# Patient Record
Sex: MALE | Race: BLACK OR AFRICAN AMERICAN | NOT HISPANIC OR LATINO | URBAN - METROPOLITAN AREA
[De-identification: names, ages, dates, MRNs, and addresses within clinical notes are randomized per-mention and may not be internally consistent; named-entity substitution may affect disease eponyms.]

---

## 2018-07-09 ENCOUNTER — INPATIENT (INPATIENT)
Facility: HOSPITAL | Age: 48
LOS: 3 days | Discharge: HOME | End: 2018-07-13
Attending: HOSPITALIST | Admitting: HOSPITALIST

## 2018-07-09 VITALS
WEIGHT: 222.67 LBS | DIASTOLIC BLOOD PRESSURE: 63 MMHG | RESPIRATION RATE: 16 BRPM | HEART RATE: 65 BPM | SYSTOLIC BLOOD PRESSURE: 118 MMHG | HEIGHT: 72 IN | TEMPERATURE: 96 F

## 2018-07-09 DIAGNOSIS — G40.109 LOCALIZATION-RELATED (FOCAL) (PARTIAL) SYMPTOMATIC EPILEPSY AND EPILEPTIC SYNDROMES WITH SIMPLE PARTIAL SEIZURES, NOT INTRACTABLE, WITHOUT STATUS EPILEPTICUS: ICD-10-CM

## 2018-07-09 DIAGNOSIS — D33.2 BENIGN NEOPLASM OF BRAIN, UNSPECIFIED: Chronic | ICD-10-CM

## 2018-07-09 LAB
ALBUMIN SERPL ELPH-MCNC: 4 G/DL — SIGNIFICANT CHANGE UP (ref 3.5–5.2)
ALP SERPL-CCNC: 52 U/L — SIGNIFICANT CHANGE UP (ref 30–115)
ALT FLD-CCNC: 16 U/L — SIGNIFICANT CHANGE UP (ref 0–41)
ANION GAP SERPL CALC-SCNC: 9 MMOL/L — SIGNIFICANT CHANGE UP (ref 7–14)
AST SERPL-CCNC: 15 U/L — SIGNIFICANT CHANGE UP (ref 0–41)
BILIRUB SERPL-MCNC: 0.2 MG/DL — SIGNIFICANT CHANGE UP (ref 0.2–1.2)
BUN SERPL-MCNC: 15 MG/DL — SIGNIFICANT CHANGE UP (ref 10–20)
CALCIUM SERPL-MCNC: 9.2 MG/DL — SIGNIFICANT CHANGE UP (ref 8.5–10.1)
CHLORIDE SERPL-SCNC: 104 MMOL/L — SIGNIFICANT CHANGE UP (ref 98–110)
CO2 SERPL-SCNC: 28 MMOL/L — SIGNIFICANT CHANGE UP (ref 17–32)
CREAT SERPL-MCNC: 1 MG/DL — SIGNIFICANT CHANGE UP (ref 0.7–1.5)
GLUCOSE SERPL-MCNC: 84 MG/DL — SIGNIFICANT CHANGE UP (ref 70–99)
HCT VFR BLD CALC: 40.6 % — LOW (ref 42–52)
HGB BLD-MCNC: 12.9 G/DL — LOW (ref 14–18)
MAGNESIUM SERPL-MCNC: 2.2 MG/DL — SIGNIFICANT CHANGE UP (ref 1.8–2.4)
MCHC RBC-ENTMCNC: 27.5 PG — SIGNIFICANT CHANGE UP (ref 27–31)
MCHC RBC-ENTMCNC: 31.8 G/DL — LOW (ref 32–37)
MCV RBC AUTO: 86.6 FL — SIGNIFICANT CHANGE UP (ref 80–94)
NRBC # BLD: 0 /100 WBCS — SIGNIFICANT CHANGE UP (ref 0–0)
PLATELET # BLD AUTO: 186 K/UL — SIGNIFICANT CHANGE UP (ref 130–400)
POTASSIUM SERPL-MCNC: 4.5 MMOL/L — SIGNIFICANT CHANGE UP (ref 3.5–5)
POTASSIUM SERPL-SCNC: 4.5 MMOL/L — SIGNIFICANT CHANGE UP (ref 3.5–5)
PROT SERPL-MCNC: 6.6 G/DL — SIGNIFICANT CHANGE UP (ref 6–8)
RBC # BLD: 4.69 M/UL — LOW (ref 4.7–6.1)
RBC # FLD: 11.8 % — SIGNIFICANT CHANGE UP (ref 11.5–14.5)
SODIUM SERPL-SCNC: 141 MMOL/L — SIGNIFICANT CHANGE UP (ref 135–146)
WBC # BLD: 5.05 K/UL — SIGNIFICANT CHANGE UP (ref 4.8–10.8)
WBC # FLD AUTO: 5.05 K/UL — SIGNIFICANT CHANGE UP (ref 4.8–10.8)

## 2018-07-09 RX ORDER — SERTRALINE 25 MG/1
50 TABLET, FILM COATED ORAL DAILY
Qty: 0 | Refills: 0 | Status: DISCONTINUED | OUTPATIENT
Start: 2018-07-09 | End: 2018-07-13

## 2018-07-09 NOTE — CONSULT NOTE ADULT - SUBJECTIVE AND OBJECTIVE BOX
Neurology/Epilepsy Consult:    JERICAKESHAV OSUNA 47y Male  MRN-[id number]    Patient is a 47y old  Male who presents for elective VEEG       HPI: History obtained from patient and EMR        PAST MEDICAL & SURGICAL HISTORY:  Refractory epilepsy  Left mesial temporal sclerosis  Left anterior temporal lobectomy 2000  Mood disorder        FAMILY HISTORY:  No pertinent family history in first degree relatives        Social History: (-) x 3      Risk factors:  Born full-term, , no complications  Normal growth and development  No febrile seizures/CNS infections  No head trauma with loss of consciousness      Allergy:  No Known Allergies        Home Medications:        MEDICATIONS  (STANDING):  sertraline 50 milliGRAM(s) Oral daily    MEDICATIONS  (PRN):  LORazepam   Injectable 2 milliGRAM(s) IV Push three times a day PRN generalized tonic-clonic seizure lasting longer than 2 minutes        Review of systems:    Constitutional: No fever, weight loss or fatigue    Eyes: No eye pain or discharge  ENMT:  No difficulty hearing; No sinus or throat pain  Neck: No pain or stiffness  Respiratory: No cough, wheezing, chills or hemoptysis  Cardiovascular: No chest pain, palpitations, shortness of breath, dyspnea on exertion  Gastrointestinal: No abdominal pain, nausea, vomiting or hematemesis; No diarrhea or constipation.   Genitourinary: No dysuria, frequency, hematuria or incontinence  Neurological: As per HPI  Skin: No rashes or lesions   Endocrine: No heat or cold intolerance; No hair loss  Musculoskeletal: No joint pain or swelling  Psychiatric: No depression, anxiety, mood swings  Heme/Lymph: No easy bruising or bleeding gums      T(F): 96.4 (18 @ 11:30), Max: 96.4 (18 @ 11:30)  HR: 65 (18 @ 11:30) (65 - 65)  BP: 118/63 (18 @ 11:30) (118/63 - 118/63)  RR: 16 (18 @ 11:30) (16 - 16)  SpO2: --      Neurologic Examination:  General:  Appearance is consistent with chronologic age.  No abnormal facies.   General: The patient is oriented to person, place, time and date.  Recent and remote memory intact.  Follows 4-step directions. Fund of knowledge is intact and normal.  Language with normal repetition, comprehension and naming.  Nondysarthric.    Cranial nerves: EOMI w/o nystagmus, skew or reported double vision.  PERRL.  No ptosis/weakness of eyelid closure.  Facial sensation is normal with normal bite.  No facial asymmetry.  Hearing grossly intact b/l.  Palate elevates midline.  Tongue midline.  Motor examination:   Normal tone, bulk and range of motion.  No tenderness, twitching, tremors or involuntary movements.  Formal Muscle Strength Testin/5 UE; 5/5 LE.  No observable drift.  Reflexes:   2+ b/l pectoralis, biceps, triceps, brachioradialis, patella and Achilles.  Plantar response downgoing b/l.  Jaw jerk, Keith, clonus absent.  Sensory examination:   Intact to light touch and pinprick, pain, temperature and proprioception and vibration in all extremities.  Cerebellum:   FTN/HKS intact with normal ALEXIA in all limbs.  No dysmetria or dysdiadokinesia.  Gait narrow based and normal.      EEG:       Assessment:  This is a 47y Male with h/o refractory epilepsy, Left mesial temporal sclerosis, Left anterior temporal lobectomy , mood disorder, who self-discontinued Keppra several months ago. Patient reports one recent episode of possible nocturnal seizure.        Discussed with Dr. Saenz      Plan:   - VEEG for characterization and assessment  - Seizure precautions  - No AED for now  - Ativan 2mg IV PRN for generalized tonic-clonic seizure lasting longer than 2 minutes, or two consecutive seizures without return to baseline in-between (ordered)  - CBC, CMP, Mg (ordered)  - Keep Mg above 2    Plan discussed with patient in details, all questions answered Neurology/Epilepsy Consult:    KESHAV PIZANO 47y Male  MRN-[id number]    Patient is a 47y old  Male who presents for elective VEEG       HPI: History obtained from patient and EMR  Patient has h/o seizures since childhood, but was started on AED at around 21 yo. Patient was tried on various AED, diagnosed with Left mesial temporal sclerosis, and in  underwent Left anterior temporal lobectomy. Patient was followed by Dr. Saenz treated with Keppra 500mg AM-750mg HS for the last few years, but self-discontinued it in November-2017. Prior to that patient started follow up with alternative medicine doctor and was put on vitamins and supplements. Patient denies any difficulty tolerating Keppra, just thought he would do better without it.   Patient's typical seizures are nocturnal, and in the past patient was on occasion would wake up in the morning with body aches or incontinence. Patient denies any episodes like that for at least a year, but recently had a different type of event. About 2 weeks ago patient woke up at night feeling one arm being very still and unable to move it for several minutes(unsure R or L). During previous admissions for VEEG patient had nocturnal electrographical seizures that he was not aware of.        PAST MEDICAL & SURGICAL HISTORY:  Refractory epilepsy  Left mesial temporal sclerosis  Left anterior temporal lobectomy   Mood disorder        FAMILY HISTORY:  Uncle - epilepsy      Social History: (-) x 3      Allergy:  No Known Allergies      Home Medications:  Zoloft 50mg daily  Vitamin C  Vitamin D 2,000 IU daily  MVI  Vitamin E  Chromium      MEDICATIONS  (STANDING):  sertraline 50 milliGRAM(s) Oral daily    MEDICATIONS  (PRN):  LORazepam   Injectable 2 milliGRAM(s) IV Push three times a day PRN generalized tonic-clonic seizure lasting longer than 2 minutes        T(F): 96.4 (18 @ 11:30), Max: 96.4 (18 @ 11:30)  HR: 65 (18 @ 11:30) (65 - 65)  BP: 118/63 (18 @ 11:30) (118/63 - 118/63)  RR: 16 (18 @ 11:30) (16 - 16)  SpO2: --      Neurologic Examination:  General:  Appearance is consistent with chronologic age.  No abnormal facies.   General: The patient is oriented to person, place, time and date.  Recent and remote memory intact.  Follows 4-step directions. Language is slow with normal repetition, occasional difficulty with word finding.    Cranial nerves: EOMI w/o nystagmus, skew or reported double vision.  PERRL.  No ptosis/weakness of eyelid closure.  Facial sensation is normal with normal bite.  Minimal facial asymmetry.  Hearing grossly intact b/l.  Palate elevates midline.  Tongue midline.  Motor examination:   Normal tone, bulk and range of motion.  No tenderness, twitching, tremors or involuntary movements.  Formal Muscle Strength Testin/5 UE; 5/5 LE.  No observable drift.  Reflexes:   2+ b/l pectoralis, biceps, triceps, brachioradialis, patella and Achilles.    Sensory examination:   Intact to light touch and pinprick, pain  Cerebellum:   FTN/HKS intact with normal ALEXIA in all limbs.  No dysmetria or dysdiadokinesia.  Gait narrow based and normal.      REEG 2018 - left mid-frontal slowing, frequent left frontal and frontopolar spikes and after going slow waves.  VEEG x 72 hrs  - 2013 - left FT slowing  VEEG x 6 days  - 2012 - 2 nocturnal electrographical partial seizures form L FT area with secondary generalization, Left FT sharp transients, Left FT slowing      Assessment:  This is a 47y Male with h/o refractory epilepsy, Left mesial temporal sclerosis, Left anterior temporal lobectomy , mood disorder, self-discontinued Keppra several months ago. Patient reports one recent episode of possible nocturnal seizure.      Discussed with Dr. Saenz      Plan:   - VEEG for further characterization and treatment plan  - Seizure precautions  - No AED for now  - Ativan 2mg IV PRN for generalized tonic-clonic seizure lasting longer than 2 minutes, or two consecutive seizures without return to baseline in-between (ordered)  - CBC, CMP, Mg   - Keep Mg above 2    Plan discussed with patient in details, all questions answered Neurology/Epilepsy Consult:    KESHAV PIZANO 47y Male  MRN-[id number]    Patient is a 47y old  Male who presents for elective VEEG       HPI: History obtained from patient and EMR  Patient has h/o seizures since childhood, but was started on AED at around 21 yo. Patient was tried on various AED, diagnosed with Left mesial temporal sclerosis, and in  underwent Left anterior temporal lobectomy. Patient was followed by Dr. Saenz treated with Keppra 500mg AM-750mg HS for the last few years, but self-discontinued it in November-2017. Prior to that patient started follow up with alternative medicine doctor and was put on vitamins and supplements. Patient denies any difficulty tolerating Keppra, just thought he would do better without it.   Patient's typical seizures are nocturnal, and in the past on occasion would wake up in the morning with body aches or incontinence. Patient denies any episodes like that for at least a year, but recently had a different type of event. About 2 weeks ago patient woke up at night feeling one arm being very still and unable to move it for several minutes(unsure R or L). During previous admissions for VEEG patient had nocturnal electrographical seizures that he was not aware of.        PAST MEDICAL & SURGICAL HISTORY:  Refractory epilepsy  Left mesial temporal sclerosis  Left anterior temporal lobectomy   Mood disorder        FAMILY HISTORY:  Uncle - epilepsy      Social History: (-) x 3      Allergy:  No Known Allergies      Home Medications:  Zoloft 50mg daily  Vitamin C  Vitamin D 2,000 IU daily  MVI  Vitamin E  Chromium      MEDICATIONS  (STANDING):  sertraline 50 milliGRAM(s) Oral daily    MEDICATIONS  (PRN):  LORazepam   Injectable 2 milliGRAM(s) IV Push three times a day PRN generalized tonic-clonic seizure lasting longer than 2 minutes        T(F): 96.4 (18 @ 11:30), Max: 96.4 (18 @ 11:30)  HR: 65 (18 @ 11:30) (65 - 65)  BP: 118/63 (18 @ 11:30) (118/63 - 118/63)  RR: 16 (18 @ 11:30) (16 - 16)  SpO2: --      Neurologic Examination:  General:  Appearance is consistent with chronologic age.  No abnormal facies.   General: The patient is oriented to person, place, time and date.  Recent and remote memory intact.  Follows 4-step directions. Language is slow with normal repetition, occasional difficulty with word finding.    Cranial nerves: EOMI w/o nystagmus, skew or reported double vision.  PERRL.  No ptosis/weakness of eyelid closure.  Facial sensation is normal with normal bite.  Minimal facial asymmetry.  Hearing grossly intact b/l.  Palate elevates midline.  Tongue midline.  Motor examination:   Normal tone, bulk and range of motion.  No tenderness, twitching, tremors or involuntary movements.  Formal Muscle Strength Testin/5 UE; 5/5 LE.  No observable drift.  Reflexes:   2+ b/l pectoralis, biceps, triceps, brachioradialis, patella and Achilles.    Sensory examination:   Intact to light touch and pinprick, pain  Cerebellum:   FTN/HKS intact with normal ALEXIA in all limbs.  No dysmetria or dysdiadokinesia.  Gait narrow based and normal.      REEG 2018 - left mid-frontal slowing, frequent left frontal and frontopolar spikes and after going slow waves.  VEEG x 72 hrs  - 2013 - left FT slowing  VEEG x 6 days  - 2012 - 2 nocturnal electrographical partial seizures form L FT area with secondary generalization, Left FT sharp transients, Left FT slowing      Assessment:  This is a 47y Male with h/o refractory epilepsy, Left mesial temporal sclerosis, Left anterior temporal lobectomy , mood disorder, self-discontinued Keppra several months ago. Patient reports one recent episode of possible nocturnal seizure.      Discussed with Dr. Saenz      Plan:   - VEEG for further characterization and treatment plan  - Seizure precautions  - No AED for now  - Ativan 2mg IV PRN for generalized tonic-clonic seizure lasting longer than 2 minutes, or two consecutive seizures without return to baseline in-between (ordered)  - CBC, CMP, Mg   - Keep Mg above 2    Plan discussed with patient in details, all questions answered

## 2018-07-09 NOTE — H&P ADULT - PROBLEM SELECTOR PLAN 1
VEEG  Neuro consult   Labs/ line   Prn Ativan VEEG  Neuro consult   Labs/ line, Ativan PRN, seizure precautions.  Prn Ativan Neuro consult for VEEG, f/u labs, Ativan PRN, seizure precautions.

## 2018-07-09 NOTE — H&P ADULT - HISTORY OF PRESENT ILLNESS
Pt. admitted for VEEG , has long standing h/o of Epilepsy , known to Dr. Daley.  Pt states last seizure over 6 months ago and on no meds at current time. Pt was on keppra but dis-continued on own approx. 6 months ago   Denies any medical complaints at the moment .   Denies nvd/ cp/ sob/ ha

## 2018-07-09 NOTE — H&P ADULT - NSHPPHYSICALEXAM_GEN_ALL_CORE
GENERAL:  48y/o Male NAD, resting comfortably.  HEAD:  Atraumatic, Normocephalic  EYES: EOMI, PERRLA, conjunctiva and sclera clear  NECK: Supple, No JVD, no cervical lymphadenopathy, non-tender  CHEST/LUNG: Clear to auscultation bilaterally; No wheeze, rhonchi, or rales  HEART: Regular rate and rhythm; S1&S2  ABDOMEN: Soft, Nontender, Nondistended x 4 quadrants; Bowel sounds present  EXTREMITIES:   Peripheral Pulses Present, No clubbing, no cyanosis, or no edema, no calf tenderness  PSYCH: AAOx3, cooperative, appropriate  NEUROLOGY: WNL  SKIN: WNL

## 2018-07-09 NOTE — CONSULT NOTE ADULT - ATTENDING COMMENTS
Agree with the history and exam.  Tate is here for further characterization and risk assessment after stopping his Keppra on his own.  will start the monitoring.  No AED  Seizure precaution

## 2018-07-09 NOTE — H&P ADULT - PROBLEM SELECTOR PROBLEM 1
Partial symptomatic epilepsy with simple partial seizures, not intractable, without status epilepticus

## 2018-07-09 NOTE — H&P ADULT - ATTENDING COMMENTS
Pt was seen and examined independently, he denies any complaints. Pt was sent for direct admission for VEEG by DR Saenz. I agree with above assessment and plan.

## 2018-07-09 NOTE — CONSULT NOTE ADULT - CONSULT REASON
VEEG for further characterization and treatment plan VEEG for further characterization, risk assessment and  and treatment plan

## 2018-07-10 DIAGNOSIS — F32.9 MAJOR DEPRESSIVE DISORDER, SINGLE EPISODE, UNSPECIFIED: ICD-10-CM

## 2018-07-10 RX ADMIN — SERTRALINE 50 MILLIGRAM(S): 25 TABLET, FILM COATED ORAL at 13:45

## 2018-07-11 RX ORDER — AMLODIPINE BESYLATE 2.5 MG/1
2.5 TABLET ORAL ONCE
Qty: 0 | Refills: 0 | Status: DISCONTINUED | OUTPATIENT
Start: 2018-07-11 | End: 2018-07-11

## 2018-07-11 RX ADMIN — SERTRALINE 50 MILLIGRAM(S): 25 TABLET, FILM COATED ORAL at 15:35

## 2018-07-12 LAB — MAGNESIUM SERPL-MCNC: 1.8 MG/DL — SIGNIFICANT CHANGE UP (ref 1.8–2.4)

## 2018-07-12 RX ADMIN — SERTRALINE 50 MILLIGRAM(S): 25 TABLET, FILM COATED ORAL at 11:15

## 2018-07-12 NOTE — PROGRESS NOTE ADULT - PROBLEM SELECTOR PLAN 1
VEEG in progress for next 24 hours, magnesium is above 2.0, on seizure precautions and Ativan PRN, f/u neurology recommendations
VEEG in progress for next 24 hours, magnesium is above 2.0, on seizure precautions and Ativan PRN, f/u neurology recommendations
VEEG in progress, neurology follow up for discharge planning,  magnesium is above 2.0, on seizure precautions and Ativan PRN, f/u neurology recommendations
VEEG in progress, neurology follow up for discharge planning,  magnesium is above 2.0, on seizure precautions and Ativan PRN, f/u neurology recommendations

## 2018-07-12 NOTE — PROGRESS NOTE ADULT - ASSESSMENT
Pt was  admitted for VEEG , has long standing h/o of Epilepsy , known to Dr. Saenz   Pt states last seizure over 6 months ago and on no meds at current time. Pt was on Keppra but dis-continued on own approx. 6 months ago.  VEEG in progress for next 24 hours as per neurology
Pt was  admitted for VEEG , has long standing h/o of Epilepsy , known to Dr. Saenz   Pt states last seizure over 6 months ago and on no meds at current time. Pt was on Keppra but dis-continued on own approx. 6 months ago.  Discharge planning after neurology follow up.
Pt was  admitted for VEEG , has long standing h/o of Epilepsy , known to Dr. Saenz   Pt states last seizure over 6 months ago and on no meds at current time. Pt was on Keppra but dis-continued on own approx. 6 months ago.  Discharge planning after neurology follow up.
Pt was  admitted for VEEG , has long standing h/o of Epilepsy , known to Dr. Saenz   Pt states last seizure over 6 months ago and on no meds at current time. Pt was on Keppra but dis-continued on own approx. 6 months ago.  VEEG in progress for next 24 hours as per neurology

## 2018-07-13 ENCOUNTER — TRANSCRIPTION ENCOUNTER (OUTPATIENT)
Age: 48
End: 2018-07-13

## 2018-07-13 VITALS
SYSTOLIC BLOOD PRESSURE: 100 MMHG | RESPIRATION RATE: 14 BRPM | TEMPERATURE: 97 F | HEART RATE: 50 BPM | DIASTOLIC BLOOD PRESSURE: 58 MMHG

## 2018-07-13 RX ORDER — OXCARBAZEPINE 300 MG/1
1 TABLET, FILM COATED ORAL
Qty: 60 | Refills: 2
Start: 2018-07-13 | End: 2018-10-10

## 2018-07-13 RX ORDER — SERTRALINE 25 MG/1
1 TABLET, FILM COATED ORAL
Qty: 0 | Refills: 0 | DISCHARGE
Start: 2018-07-13

## 2018-07-13 RX ADMIN — SERTRALINE 50 MILLIGRAM(S): 25 TABLET, FILM COATED ORAL at 12:12

## 2018-07-13 NOTE — DISCHARGE NOTE ADULT - MEDICATION SUMMARY - MEDICATIONS TO TAKE
I will START or STAY ON the medications listed below when I get home from the hospital:    Trileptal 150 mg oral tablet  -- take 1 tablet before sleep for 7 days, then increase to 2 tablets before sleep  -- It is very important that you take or use this exactly as directed.  Do not skip doses or discontinue unless directed by your doctor.  May cause drowsiness.  Alcohol may intensify this effect.  Use care when operating dangerous machinery.    -- Indication: For Partial symptomatic epilepsy with simple partial seizures, not intractable, without status epilepticus    sertraline 50 mg oral tablet  -- 1 tab(s) by mouth once a day  -- Indication: For Depression

## 2018-07-13 NOTE — DISCHARGE NOTE ADULT - ADDITIONAL INSTRUCTIONS
f/u with DR Saenz on August 29 at 3:20 pm , do not drive until cleared by neurology, take all meds as prescribed ,f/u with PMD

## 2018-07-13 NOTE — DISCHARGE NOTE ADULT - CARE PROVIDER_API CALL
Roberto Saenz), Neurology  [address] [place] [address]  Phone: [phone]  Fax: [phone]    PMD DR Kincaid   [facility]  Phone: (   )    -  Fax: (   )    -

## 2018-07-13 NOTE — DISCHARGE NOTE ADULT - HOSPITAL COURSE
Pt was  admitted for VEEG , has long standing h/o of Epilepsy , known to Dr. Saenz   Pt states last seizure over 6 months ago and on no meds at current time. Pt was on Keppra but dis-continued on own approx. 6 months ago. VEEG for significant for focal and generalized slowing----left hemispheric focal slowing; Interictal activity----left md-post. temporal sharps/spikes. Pt was started on Trileptal 150 mg QHS for one week and 300 QHS after.  Today pt was seen and examined at bedside, he feels good and denies any complaints, stable for discharge home.  I spent more that 35 min on discharge process, case discussed with neurology team.

## 2018-07-13 NOTE — DISCHARGE NOTE ADULT - PATIENT PORTAL LINK FT
You can access the [facility], offered by [facility], by registering with the following website: [email or web address]

## 2018-07-13 NOTE — DISCHARGE NOTE ADULT - PLAN OF CARE
maintain seizure free take all meds as prescribed ,f/ u with PMD and neurology, DO NOT DRIVE until cleared by neurology take all meds as prescribed ,f/u with PMD after discharge

## 2018-07-13 NOTE — PROGRESS NOTE ADULT - SUBJECTIVE AND OBJECTIVE BOX
Pt was  admitted for VEEG , has long standing h/o of Epilepsy , known to Dr. Saenz   Pt states last seizure over 6 months ago and on no meds at current time. Pt was on Keppra but dis-continued on own approx. 6 months ago.  Today pt feels OK today, no complaints, currently on VEEG    Vital Signs Last 24 Hrs  T(C): 36.4 (10 Jul 2018 05:55), Max: 36.4 (09 Jul 2018 22:00)  T(F): 97.5 (10 Jul 2018 05:55), Max: 97.6 (09 Jul 2018 22:00)  HR: 45 (10 Jul 2018 05:55) (45 - 55)  BP: 117/68 (10 Jul 2018 05:55) (117/68 - 121/69)  RR: 16 (10 Jul 2018 05:55) (16 - 17)    PHYSICAL EXAM:  GENERAL: NAD, well-groomed, well-developed  HEAD:  Atraumatic, Normocephalic  EYES: EOMI, PERRLA, conjunctiva and sclera clear  ENMT: No tonsillar erythema, exudates, or enlargement; Moist mucous membranes, Good dentition, No lesions  NECK: Supple, No JVD, Normal thyroid  NERVOUS SYSTEM:  Alert & Oriented X3, Good concentration; Motor Strength 5/5 B/L upper and lower extremities; DTRs 2+ intact and symmetric  CHEST/LUNG: Clear to percussion bilaterally; No rales, rhonchi, wheezing, or rubs  HEART: Regular rate and rhythm; No murmurs, rubs, or gallops  ABDOMEN: Soft, Nontender, Nondistended; Bowel sounds present  EXTREMITIES:  2+ Peripheral Pulses, No clubbing, cyanosis, or edema  LYMPH: No lymphadenopathy noted  SKIN: No rashes or lesions      LABS:                        12.9   5.05  )-----------( 186      ( 09 Jul 2018 15:55 )             40.6     07-09    141  |  104  |  15  ----------------------------<  84  4.5   |  28  |  1.0    Ca    9.2      09 Jul 2018 15:55  Mg     2.2     07-09    TPro  6.6  /  Alb  4.0  /  TBili  0.2  /  DBili  x   /  AST  15  /  ALT  16  /  AlkPhos  52  07-09    RADIOLOGY & ADDITIONAL TESTS:  None     MEDICATIONS  (STANDING):  sertraline 50 milliGRAM(s) Oral daily    MEDICATIONS  (PRN):  LORazepam   Injectable 2 milliGRAM(s) IV Push three times a day PRN generalized tonic-clonic seizure lasting longer than 2 minutes
Epilepsy Attending Note:     JERICAKESHAV OSUNA    47y Male  MRN [id number]    Vital Signs Last 24 Hrs  T(C): 36.4 (10 Jul 2018 05:55), Max: 36.4 (09 Jul 2018 22:00)  T(F): 97.5 (10 Jul 2018 05:55), Max: 97.6 (09 Jul 2018 22:00)  HR: 45 (10 Jul 2018 05:55) (45 - 65)  BP: 117/68 (10 Jul 2018 05:55) (117/68 - 121/69)  BP(mean): --  RR: 16 (10 Jul 2018 05:55) (16 - 17)  SpO2: --                          12.9   5.05  )-----------( 186      ( 09 Jul 2018 15:55 )             40.6       07-09    141  |  104  |  15  ----------------------------<  84  4.5   |  28  |  1.0    Ca    9.2      09 Jul 2018 15:55  Mg     2.2     07-09    TPro  6.6  /  Alb  4.0  /  TBili  0.2  /  DBili  x   /  AST  15  /  ALT  16  /  AlkPhos  52  07-09      MEDICATIONS  (STANDING):  sertraline 50 milliGRAM(s) Oral daily    MEDICATIONS  (PRN):  LORazepam   Injectable 2 milliGRAM(s) IV Push three times a day PRN generalized tonic-clonic seizure lasting longer than 2 minutes            VEEG in the last 24 hours:    Background------8-9 hz    Focal and generalized slowing-----left hemispheric focal slowing    Interictal activity----large number of left fronto-temporal sharps and spikes    Events---none    Seizures---none    Impression :potential for partial seizure from the left side    Plan - continue the monitoring, No AED
Epilepsy Attending Note:     KESHAV PIZANO    47y Male  MRN [id number]    Vital Signs Last 24 Hrs  T(C): 36.2 (13 Jul 2018 05:49), Max: 37.1 (12 Jul 2018 22:05)  T(F): 97.2 (13 Jul 2018 05:49), Max: 98.8 (12 Jul 2018 22:05)  HR: 50 (13 Jul 2018 05:49) (50 - 73)  BP: 100/58 (13 Jul 2018 05:49) (100/58 - 125/58)  BP(mean): --  RR: 14 (13 Jul 2018 05:49) (14 - 16)  SpO2: --          Mg     1.8     07-12        MEDICATIONS  (STANDING):  sertraline 50 milliGRAM(s) Oral daily    MEDICATIONS  (PRN):  LORazepam   Injectable 2 milliGRAM(s) IV Push three times a day PRN generalized tonic-clonic seizure lasting longer than 2 minutes            VEEG in the last 24 hours:    Background--------8-9 hz    Focal and generalized slowing----left hemispheric focal slowing    Interictal activity----left md-post. temporal sharps/spikes     Events---none    Seizures---none    Impression:  abnormal due to the above    Plan - DC the monitoring, discussed with him he agrees to be started on AED will discuss with the insurance regarding the best choices (aptiom or Oxtellar) . Considering his compliance  and side effect profile'           if issues with insurance then would consider trileptal
Epilepsy Attending Note:     KESHAV PIZANO    47y Male  MRN [id number]    Vital Signs Last 24 Hrs  T(C): 35.8 (12 Jul 2018 05:01), Max: 36.9 (11 Jul 2018 14:50)  T(F): 96.4 (12 Jul 2018 05:01), Max: 98.4 (11 Jul 2018 14:50)  HR: 74 (12 Jul 2018 05:01) (58 - 74)  BP: 105/64 (12 Jul 2018 05:01) (105/64 - 113/74)  BP(mean): --  RR: 16 (12 Jul 2018 05:01) (16 - 16)  SpO2: --          Mg     1.8     07-12        MEDICATIONS  (STANDING):  sertraline 50 milliGRAM(s) Oral daily    MEDICATIONS  (PRN):  LORazepam   Injectable 2 milliGRAM(s) IV Push three times a day PRN generalized tonic-clonic seizure lasting longer than 2 minutes            VEEG in the last 24 hours:    Background-------8-9 hz    Focal and generalized slowing---left hemispheric focal slowing and left hemispheric breach artifat    Interictal activity---left mid-posterior temporal sharps    Events---none    Seizures---none    Impression:------abnormal EEG due to the presence of left hem, focal slowing and also sharp and spikes    Plan - will continue the monitoring to capture an epsode
Epilepsy Attending Note:     KESHAV PIZANO    47y Male  MRN [id number]    Vital Signs Last 24 Hrs  T(C): 35.9 (11 Jul 2018 06:08), Max: 36.2 (10 Jul 2018 14:00)  T(F): 96.7 (11 Jul 2018 06:08), Max: 97.1 (10 Jul 2018 14:00)  HR: 52 (11 Jul 2018 06:08) (52 - 66)  BP: 113/67 (11 Jul 2018 06:08) (113/67 - 125/81)  BP(mean): --  RR: 16 (11 Jul 2018 06:08) (16 - 17)  SpO2: --                          12.9   5.05  )-----------( 186      ( 09 Jul 2018 15:55 )             40.6       07-09    141  |  104  |  15  ----------------------------<  84  4.5   |  28  |  1.0    Ca    9.2      09 Jul 2018 15:55  Mg     2.2     07-09    TPro  6.6  /  Alb  4.0  /  TBili  0.2  /  DBili  x   /  AST  15  /  ALT  16  /  AlkPhos  52  07-09      MEDICATIONS  (STANDING):  sertraline 50 milliGRAM(s) Oral daily    MEDICATIONS  (PRN):  LORazepam   Injectable 2 milliGRAM(s) IV Push three times a day PRN generalized tonic-clonic seizure lasting longer than 2 minutes            VEEG in the last 24 hours:    Background----8-9    Focal and generalized slowing---FT slowing    Interictal activity---left mid-posterior temporal sharps    Events-----none    Seizures----none    Impression:  S/P left temporal lobectomy  Plan -   continue the monitoring, HV/PS
Epilepsy NP Note:    Follow up neurology appointment is scheduled with Dr. Saenz  for August 29, 2018 at 3:20 pm    [address] [place] [address], [address]  [phone]    Rx for Trileptal sent ot the pharmacy.  Patient is given Rx for blood tests to be done prior to follow up (Trileptal level, CBC, CMP)    Information is given to the patient and hospitalist.
Pt was  admitted for VEEG , has long standing h/o of Epilepsy , known to Dr. Saenz   Pt states last seizure over 6 months ago and on no meds at current time. Pt was on Keppra but dis-continued on own approx. 6 months ago.  Today pt feels OK today, no complaints, currently on VEEG    Vital Signs Last 24 Hrs  T(C): 35.8 (12 Jul 2018 05:01), Max: 36.9 (11 Jul 2018 14:50)  T(F): 96.4 (12 Jul 2018 05:01), Max: 98.4 (11 Jul 2018 14:50)  HR: 74 (12 Jul 2018 05:01) (58 - 74)  BP: 105/64 (12 Jul 2018 05:01) (105/64 - 113/74)  RR: 16 (12 Jul 2018 05:01) (16 - 16)        PHYSICAL EXAM:  GENERAL: NAD, well-groomed, well-developed  HEAD:  Atraumatic, Normocephalic  EYES: EOMI, PERRLA, conjunctiva and sclera clear  ENMT: No tonsillar erythema, exudates, or enlargement; Moist mucous membranes, Good dentition, No lesions  NECK: Supple, No JVD, Normal thyroid  NERVOUS SYSTEM:  Alert & Oriented X3, Good concentration; Motor Strength 5/5 B/L upper and lower extremities; DTRs 2+ intact and symmetric  CHEST/LUNG: Clear to percussion bilaterally; No rales, rhonchi, wheezing, or rubs  HEART: Regular rate and rhythm; No murmurs, rubs, or gallops  ABDOMEN: Soft, Nontender, Nondistended; Bowel sounds present  EXTREMITIES:  2+ Peripheral Pulses, No clubbing, cyanosis, or edema  LYMPH: No lymphadenopathy noted  SKIN: No rashes or lesions      LABS:  no new labs                         12.9   5.05  )-----------( 186      ( 09 Jul 2018 15:55 )             40.6     07-09    141  |  104  |  15  ----------------------------<  84  4.5   |  28  |  1.0    Ca    9.2      09 Jul 2018 15:55  Mg     2.2     07-09    TPro  6.6  /  Alb  4.0  /  TBili  0.2  /  DBili  x   /  AST  15  /  ALT  16  /  AlkPhos  52  07-09    RADIOLOGY & ADDITIONAL TESTS:  None     MEDICATIONS  (STANDING):  sertraline 50 milliGRAM(s) Oral daily    MEDICATIONS  (PRN):  LORazepam   Injectable 2 milliGRAM(s) IV Push three times a day PRN generalized tonic-clonic seizure lasting longer than 2 minutes
Pt was  admitted for VEEG , has long standing h/o of Epilepsy , known to Dr. Saenz   Pt states last seizure over 6 months ago and on no meds at current time. Pt was on Keppra but dis-continued on own approx. 6 months ago.  Today pt feels OK today, no complaints, currently on VEEG    Vital Signs Last 24 Hrs  T(C): 35.9 (11 Jul 2018 06:08), Max: 36.2 (10 Jul 2018 14:00)  T(F): 96.7 (11 Jul 2018 06:08), Max: 97.1 (10 Jul 2018 14:00)  HR: 52 (11 Jul 2018 06:08) (52 - 66)  BP: 113/67 (11 Jul 2018 06:08) (113/67 - 125/81)  BP(mean): --  RR: 16 (11 Jul 2018 06:08) (16 - 17)      PHYSICAL EXAM:  GENERAL: NAD, well-groomed, well-developed  HEAD:  Atraumatic, Normocephalic  EYES: EOMI, PERRLA, conjunctiva and sclera clear  ENMT: No tonsillar erythema, exudates, or enlargement; Moist mucous membranes, Good dentition, No lesions  NECK: Supple, No JVD, Normal thyroid  NERVOUS SYSTEM:  Alert & Oriented X3, Good concentration; Motor Strength 5/5 B/L upper and lower extremities; DTRs 2+ intact and symmetric  CHEST/LUNG: Clear to percussion bilaterally; No rales, rhonchi, wheezing, or rubs  HEART: Regular rate and rhythm; No murmurs, rubs, or gallops  ABDOMEN: Soft, Nontender, Nondistended; Bowel sounds present  EXTREMITIES:  2+ Peripheral Pulses, No clubbing, cyanosis, or edema  LYMPH: No lymphadenopathy noted  SKIN: No rashes or lesions      LABS:  no new labs                         12.9   5.05  )-----------( 186      ( 09 Jul 2018 15:55 )             40.6     07-09    141  |  104  |  15  ----------------------------<  84  4.5   |  28  |  1.0    Ca    9.2      09 Jul 2018 15:55  Mg     2.2     07-09    TPro  6.6  /  Alb  4.0  /  TBili  0.2  /  DBili  x   /  AST  15  /  ALT  16  /  AlkPhos  52  07-09    RADIOLOGY & ADDITIONAL TESTS:  None     MEDICATIONS  (STANDING):  sertraline 50 milliGRAM(s) Oral daily    MEDICATIONS  (PRN):  LORazepam   Injectable 2 milliGRAM(s) IV Push three times a day PRN generalized tonic-clonic seizure lasting longer than 2 minutes
Pt was  admitted for VEEG , has long standing h/o of Epilepsy , known to Dr. Saenz   Pt states last seizure over 6 months ago and on no meds at current time. Pt was on Keppra but dis-continued on own approx. 6 months ago.  VEEG in progress, awaiting for neurology follow up    Vital Signs Last 24 Hrs  T(C): 35.8 (12 Jul 2018 05:01), Max: 36.9 (11 Jul 2018 14:50)  T(F): 96.4 (12 Jul 2018 05:01), Max: 98.4 (11 Jul 2018 14:50)  HR: 74 (12 Jul 2018 05:01) (58 - 74)  BP: 105/64 (12 Jul 2018 05:01) (105/64 - 113/74)  RR: 16 (12 Jul 2018 05:01) (16 - 16)    PHYSICAL EXAM:  GENERAL: NAD, well-groomed, well-developed  HEAD:  Atraumatic, Normocephalic  EYES: EOMI, PERRLA, conjunctiva and sclera clear  ENMT: No tonsillar erythema, exudates, or enlargement; Moist mucous membranes, Good dentition, No lesions  NECK: Supple, No JVD, Normal thyroid  NERVOUS SYSTEM:  Alert & Oriented X3, Good concentration; Motor Strength 5/5 B/L upper and lower extremities; DTRs 2+ intact and symmetric  CHEST/LUNG: Clear to percussion bilaterally; No rales, rhonchi, wheezing, or rubs  HEART: Regular rate and rhythm; No murmurs, rubs, or gallops  ABDOMEN: Soft, Nontender, Nondistended; Bowel sounds present  EXTREMITIES:  2+ Peripheral Pulses, No clubbing, cyanosis, or edema  LYMPH: No lymphadenopathy noted  SKIN: No rashes or lesions      LABS:  no new labs                         12.9   5.05  )-----------( 186      ( 09 Jul 2018 15:55 )             40.6     07-09    141  |  104  |  15  ----------------------------<  84  4.5   |  28  |  1.0    Ca    9.2      09 Jul 2018 15:55  Mg     2.2     07-09    TPro  6.6  /  Alb  4.0  /  TBili  0.2  /  DBili  x   /  AST  15  /  ALT  16  /  AlkPhos  52  07-09    RADIOLOGY & ADDITIONAL TESTS:  None     MEDICATIONS  (STANDING):  sertraline 50 milliGRAM(s) Oral daily    MEDICATIONS  (PRN):  LORazepam   Injectable 2 milliGRAM(s) IV Push three times a day PRN generalized tonic-clonic seizure lasting longer than 2 minutes
will order magnesium level

## 2018-07-13 NOTE — PROGRESS NOTE ADULT - PROVIDER SPECIALTY LIST ADULT
Hospitalist
Neurology
Hospitalist

## 2018-07-13 NOTE — DISCHARGE NOTE ADULT - PROVIDER TOKENS
TOKEN:'[id number]',FREE:[LAST:[PMD DR Sanjiv],PHONE:[(   )    -],FAX:[(   )    -],ADDRESS:[PMD]]

## 2018-07-19 DIAGNOSIS — Z87.898 PERSONAL HISTORY OF OTHER SPECIFIED CONDITIONS: ICD-10-CM

## 2018-07-19 DIAGNOSIS — Q04.3 OTHER REDUCTION DEFORMITIES OF BRAIN: ICD-10-CM

## 2018-07-19 DIAGNOSIS — Z91.14 PATIENT'S OTHER NONCOMPLIANCE WITH MEDICATION REGIMEN: ICD-10-CM

## 2018-07-19 DIAGNOSIS — G40.109 LOCALIZATION-RELATED (FOCAL) (PARTIAL) SYMPTOMATIC EPILEPSY AND EPILEPTIC SYNDROMES WITH SIMPLE PARTIAL SEIZURES, NOT INTRACTABLE, WITHOUT STATUS EPILEPTICUS: ICD-10-CM

## 2018-07-19 DIAGNOSIS — Z98.890 OTHER SPECIFIED POSTPROCEDURAL STATES: ICD-10-CM

## 2018-07-19 DIAGNOSIS — F39 UNSPECIFIED MOOD [AFFECTIVE] DISORDER: ICD-10-CM

## 2018-07-19 DIAGNOSIS — F32.9 MAJOR DEPRESSIVE DISORDER, SINGLE EPISODE, UNSPECIFIED: ICD-10-CM

## 2019-07-31 PROBLEM — Z00.00 ENCOUNTER FOR PREVENTIVE HEALTH EXAMINATION: Status: ACTIVE | Noted: 2019-07-31

## 2020-01-31 PROBLEM — G40.109 LOCALIZATION-RELATED (FOCAL) (PARTIAL) SYMPTOMATIC EPILEPSY AND EPILEPTIC SYNDROMES WITH SIMPLE PARTIAL SEIZURES, NOT INTRACTABLE, WITHOUT STATUS EPILEPTICUS: Chronic | Status: ACTIVE | Noted: 2018-07-09

## 2020-02-05 ENCOUNTER — INPATIENT (INPATIENT)
Facility: HOSPITAL | Age: 50
LOS: 1 days | Discharge: HOME | End: 2020-02-07
Attending: HOSPITALIST | Admitting: HOSPITALIST
Payer: COMMERCIAL

## 2020-02-05 VITALS
DIASTOLIC BLOOD PRESSURE: 60 MMHG | SYSTOLIC BLOOD PRESSURE: 115 MMHG | RESPIRATION RATE: 16 BRPM | HEART RATE: 54 BPM | HEIGHT: 72 IN | WEIGHT: 223.55 LBS | TEMPERATURE: 96 F

## 2020-02-05 DIAGNOSIS — G40.909 EPILEPSY, UNSPECIFIED, NOT INTRACTABLE, WITHOUT STATUS EPILEPTICUS: ICD-10-CM

## 2020-02-05 DIAGNOSIS — D33.2 BENIGN NEOPLASM OF BRAIN, UNSPECIFIED: Chronic | ICD-10-CM

## 2020-02-05 LAB
ALBUMIN SERPL ELPH-MCNC: 4.3 G/DL — SIGNIFICANT CHANGE UP (ref 3.5–5.2)
ALP SERPL-CCNC: 57 U/L — SIGNIFICANT CHANGE UP (ref 30–115)
ALT FLD-CCNC: 13 U/L — SIGNIFICANT CHANGE UP (ref 0–41)
ANION GAP SERPL CALC-SCNC: 12 MMOL/L — SIGNIFICANT CHANGE UP (ref 7–14)
AST SERPL-CCNC: 14 U/L — SIGNIFICANT CHANGE UP (ref 0–41)
BILIRUB SERPL-MCNC: 0.5 MG/DL — SIGNIFICANT CHANGE UP (ref 0.2–1.2)
BUN SERPL-MCNC: 9 MG/DL — LOW (ref 10–20)
CALCIUM SERPL-MCNC: 9.1 MG/DL — SIGNIFICANT CHANGE UP (ref 8.5–10.1)
CHLORIDE SERPL-SCNC: 102 MMOL/L — SIGNIFICANT CHANGE UP (ref 98–110)
CO2 SERPL-SCNC: 26 MMOL/L — SIGNIFICANT CHANGE UP (ref 17–32)
CREAT SERPL-MCNC: 1 MG/DL — SIGNIFICANT CHANGE UP (ref 0.7–1.5)
GLUCOSE SERPL-MCNC: 91 MG/DL — SIGNIFICANT CHANGE UP (ref 70–99)
HCT VFR BLD CALC: 42.3 % — SIGNIFICANT CHANGE UP (ref 42–52)
HGB BLD-MCNC: 13.4 G/DL — LOW (ref 14–18)
MAGNESIUM SERPL-MCNC: 2.1 MG/DL — SIGNIFICANT CHANGE UP (ref 1.8–2.4)
MCHC RBC-ENTMCNC: 27.6 PG — SIGNIFICANT CHANGE UP (ref 27–31)
MCHC RBC-ENTMCNC: 31.7 G/DL — LOW (ref 32–37)
MCV RBC AUTO: 87.2 FL — SIGNIFICANT CHANGE UP (ref 80–94)
NRBC # BLD: 0 /100 WBCS — SIGNIFICANT CHANGE UP (ref 0–0)
PHOSPHATE SERPL-MCNC: 3.2 MG/DL — SIGNIFICANT CHANGE UP (ref 2.1–4.9)
PLATELET # BLD AUTO: 202 K/UL — SIGNIFICANT CHANGE UP (ref 130–400)
POTASSIUM SERPL-MCNC: 4.1 MMOL/L — SIGNIFICANT CHANGE UP (ref 3.5–5)
POTASSIUM SERPL-SCNC: 4.1 MMOL/L — SIGNIFICANT CHANGE UP (ref 3.5–5)
PROT SERPL-MCNC: 6.9 G/DL — SIGNIFICANT CHANGE UP (ref 6–8)
RBC # BLD: 4.85 M/UL — SIGNIFICANT CHANGE UP (ref 4.7–6.1)
RBC # FLD: 11.6 % — SIGNIFICANT CHANGE UP (ref 11.5–14.5)
SODIUM SERPL-SCNC: 140 MMOL/L — SIGNIFICANT CHANGE UP (ref 135–146)
WBC # BLD: 3.53 K/UL — LOW (ref 4.8–10.8)
WBC # FLD AUTO: 3.53 K/UL — LOW (ref 4.8–10.8)

## 2020-02-05 PROCEDURE — 99221 1ST HOSP IP/OBS SF/LOW 40: CPT

## 2020-02-05 PROCEDURE — 99223 1ST HOSP IP/OBS HIGH 75: CPT

## 2020-02-05 RX ORDER — ENOXAPARIN SODIUM 100 MG/ML
40 INJECTION SUBCUTANEOUS DAILY
Refills: 0 | Status: DISCONTINUED | OUTPATIENT
Start: 2020-02-05 | End: 2020-02-07

## 2020-02-05 NOTE — H&P ADULT - PROBLEM SELECTOR PLAN 1
admit to VEEG unit, Neurology consult: Dr Saenz admit to VEEG unit, Neurology consult: Dr Saenz, follow up pending labwork.

## 2020-02-05 NOTE — H&P ADULT - HISTORY OF PRESENT ILLNESS
45 y/o male Seizure disorder since childhood. Patient was on anti-seizure meds that he stopped on his own about 2-3 yrs ago. He now reports possible seizure episodes at night due to his waking up with urinary incontinence. Last episode  3 weeks ago. Frequency between these episodes is variable  -referred to VEEG unit by Dr Saenz  for evaluation. 43 y/o male Seizure disorder since childhood. Patient was on anti-seizure meds that he stopped on his own about 2-3 yrs ago. He now reports possible seizure episodes at night due to his waking up with urinary incontinence. -Last episode  3 weeks ago. Frequency between these episodes is variable.  -Referred to VEEG unit by Dr Saenz  for evaluation.

## 2020-02-05 NOTE — H&P ADULT - ATTENDING COMMENTS
#Seizure disorder  self discontinued aed  veeg  f/u neuro  monitor off aed currently  seizure precautions    #Progress Note Handoff:  Pending (specify):  Consults_________, Tests________, Test Results_______, Other___veeg______  Family discussion:d/w pt at bedside re: treatment plan, primary dx  Disposition: Home_x__/SNF___/Other________/Unknown at this time________

## 2020-02-05 NOTE — H&P ADULT - ASSESSMENT
48 y/o male with Seizure disorder since childhood, admitted for evaluation of possible night time seizure episodes. 50 y/o male with Seizure disorder since childhood, on no medications. Admitted for evaluation of possible night time seizure episodes.

## 2020-02-05 NOTE — CONSULT NOTE ADULT - ATTENDING COMMENTS
Agree with the history and the plan  He is known to me . He is S/P left temporal lobectomy for a refractory seizure disorder.  He has been doing well since then. however for the last few years also has been having nocturnal events characterized by urinary incontinence, and at times not feeling well the next morning  no events during the day.  he has a full time job  Main issue with him is the compliance. he did take himself off AED multiple times. he also does not F/U regularly and now he is off AED and is here for further characterization and to have a long term treatment plan  will start the monitor  seizure precaution

## 2020-02-05 NOTE — H&P ADULT - NSHPPHYSICALEXAM_GEN_ALL_CORE
PHYSICAL EXAM:  Vital Signs Last 24 Hrs    T(F): 96.4 (02-05-20 @ 11:00), Max: 96.4 (02-05-20 @ 11:00)  HR: 54 (02-05-20 @ 11:00) (54 - 54)  BP: 115/60 (02-05-20 @ 11:00)  RR: 16 (02-05-20 @ 11:00) (16 - 16)    Constitutional: NAD, A&O x3    Eyes: PERRLA    Respiratory: +air entry, no rales, no rhonchi, no wheezes    Cardiovascular: +S1 and S2, regular rate and rhythm    Gastrointestinal: +BS, soft, non-tender, not distended    Extremities:  no edema, no calf tenderness    Vascular: +dorsal pedis and radial pulses, no extremity cyanosis    Neurological: sensation intact, ROM equal B/L, CN II-XII intact    Skin: no rashes, normal turgor PHYSICAL EXAM:  Vital Signs Last 24 Hrs    T(F): 96.4 (02-05-20 @ 11:00), Max: 96.4 (02-05-20 @ 11:00)  HR: 54 (02-05-20 @ 11:00) (54 - 54)  BP: 115/60 (02-05-20 @ 11:00)  RR: 16 (02-05-20 @ 11:00) (16 - 16)    Constitutional: NAD, A&O x3; psychomotor slowing    Eyes: PERRLA    Respiratory: +air entry, no rales, no rhonchi, no wheezes    Cardiovascular: +S1 and S2, regular rate and rhythm    Gastrointestinal: +BS, soft, non-tender, not distended    Extremities:  no edema, no calf tenderness    Vascular: +dorsal pedis and radial pulses, no extremity cyanosis    Neurological: sensation intact, ROM equal B/L, CN II-XII intact    Skin: no rashes, normal turgor

## 2020-02-05 NOTE — H&P ADULT - NSHPSOCIALHISTORY_GEN_ALL_CORE
Tobacco use:   EtOH use:  Illicit drug use:  Marital Status: Tobacco use: No  EtOH use: No  Illicit drug use: No

## 2020-02-05 NOTE — CONSULT NOTE ADULT - SUBJECTIVE AND OBJECTIVE BOX
Neurology/Epilepsy Consult:    KESHAV MONROE 49y Male  MRN-[id number]    Patient is a 49y old  Male who presents with a chief complaint of       HPI: History obtained from patient and EMR  Patient has h/o seizures since childhood, but states was started on AED at 21 yo. He was tried on various AEDs, diagnosed with Left mesial temporal sclerosis, and in  underwent Left anterior temporal lobectomy.   Patient was treated with Keppra for few years, but self-discontinued it in 2017. Prior to that he started follow up with alternative medicine doctor and was put on vitamins and supplements. Patient denied any difficulty tolerating Keppra, just thought he would do better without it.   He was most recently admitted to Epilepsy Unit in 2018 after waking up at night feeling one arm being very still and being unable to move it for several minutes (unclear R or L). Patient had 3-day VEEG (see report below) and agreed to re-start AED. He was put on Trileptal to be switched to Oxtellar as out-patient. Patient took medication for a couple of months, then stopped since did not think it was necessary.  Patient last followed up as out-patient with Dr. Saenz in 2018, then recently called the office reporting nocturnal events when he would wake up with body aches and/or bladder incontinence. Most recent episodes happened 3 weeks ago, but had several such episodes in 2019.   It must be noted that during previous admissions for VEEG patient had nocturnal electrographical seizures that he was not aware of.          PAST MEDICAL & SURGICAL HISTORY:  Refractory epilepsy  Left mesial temporal sclerosis  Left anterior temporal lobectomy   Mood disorder          FAMILY HISTORY:  Uncle - epilepsy        Social History: (-) x 3        Allergy:  No Known Allergies        Home Medications:  MVI        MEDICATIONS  (STANDING):    MEDICATIONS  (PRN):  LORazepam   Injectable 2 milliGRAM(s) IV Push three times a day PRN generalized tonic-clonic seizure lasting longer than 2 minutes        T(F): 96.4 (20 @ 11:00), Max: 96.4 (20 @ 11:00)  HR: 54 (20 @ 11:00) (54 - 54)  BP: 115/60 (20 @ 11:00) (115/60 - 115/60)  RR: 16 (20 @ 11:00) (16 - 16)  SpO2: --      Neurologic Examination:  General:  Appearance is consistent with chronologic age.  No abnormal facies.   General: The patient is oriented to person, place, time and date.  Recent and remote memory intact.  Follows 4-step directions. Fund of knowledge is intact and normal. Language is slow with normal repetition, occasional difficulty with word finding.     Cranial nerves: EOMI w/o nystagmus, skew or reported double vision.  PERRL.  No ptosis/weakness of eyelid closure.  Facial sensation is normal with normal bite.  Minimal facial asymmetry.  Hearing grossly intact b/l.  Palate elevates midline.  Tongue midline.  Motor examination:   Normal tone, bulk and range of motion.  No tenderness, twitching, tremors or involuntary movements.  Formal Muscle Strength Testin/5 UE; 5/5 LE.  No observable drift.  Reflexes:   2+ b/l   Sensory examination:   Intact to light touch and pinprick, pain, temperature and proprioception and vibration in all extremities.  Cerebellum:   FTN/HKS intact with normal ALEXIA in all limbs.  No dysmetria or dysdiadokinesia.  Gait narrow based and normal.          VEEG  - 2018 - left hemispheric focal slowing, left breach artifact,, large number of left temporal sharps and spikes  REEG 2018 - left mid-frontal slowing, frequent left frontal and frontopolar spikes and after going slow waves.  VEEG  - 2013 - left FT slowing  VEEG  - 2012 - 2 nocturnal electrographical partial seizures form Left FT area with secondary generalization, Left FT sharp transients, Left FT slowing      Assessment:  This is a 50yo Male with h/o refractory epilepsy, Left mesial temporal sclerosis, Left anterior temporal lobectomy in , mood disorder, who self-discontinued AED over a year ago. Patient reports episodes of waking up from sleep with bladder incontinence and feeling achy, most recent event 3 weeks ago      Discussed with Dr. Saenz      Plan:   - VEEG for better characterization and treatment plan  - Seizure precautions  - No AED fro now  - Ativan 2mg IV PRN for generalized tonic-clonic seizure lasting longer than 2 minutes, or two consecutive seizures without return to baseline in-between (ordered)  - CBC, CMP, Mg (ordered)  - Keep Mg above 2    Plan discussed with patient in details, all questions answered.    Discussed with PA. Neurology/Epilepsy Consult:    KESHAV MONROE 49y Male  MRN-[id number]    Patient is a 49y old  Male who presents for elective VEEG monitoring      HPI: History obtained from patient and EMR  Patient has h/o seizures since childhood, but states was started on AED at 21 yo. He was tried on various AEDs, diagnosed with Left mesial temporal sclerosis, and in  underwent Left anterior temporal lobectomy.   Patient was treated with Keppra for few years, but self-discontinued it in 2017. Prior to that he started follow up with alternative medicine doctor and was put on vitamins and supplements. Patient denied any difficulty tolerating Keppra, just thought he would do better without it.   He was most recently admitted to Epilepsy Unit in 2018 after waking up at night feeling one arm being very still and being unable to move it for several minutes (unclear R or L). Patient had 3-day VEEG (see report below) and agreed to re-start AED. He was put on Trileptal to be switched to Oxtellar as out-patient. Patient took medication for a couple of months, then stopped since did not think it was necessary.  Patient last followed up as out-patient with Dr. Saenz in 2018, then recently called the office reporting nocturnal events when he would wake up with body aches and/or bladder incontinence. Most recent episodes happened 3 weeks ago, but had several such episodes in 2019.   It must be noted that during previous admissions for VEEG patient had nocturnal electrographical seizures that he was not aware of.          PAST MEDICAL & SURGICAL HISTORY:  Refractory epilepsy  Left mesial temporal sclerosis  Left anterior temporal lobectomy   Mood disorder          FAMILY HISTORY:  Uncle - epilepsy        Social History: (-) x 3        Allergy:  No Known Allergies        Home Medications:  MVI        MEDICATIONS  (STANDING):    MEDICATIONS  (PRN):  LORazepam   Injectable 2 milliGRAM(s) IV Push three times a day PRN generalized tonic-clonic seizure lasting longer than 2 minutes        T(F): 96.4 (20 @ 11:00), Max: 96.4 (20 @ 11:00)  HR: 54 (20 @ 11:00) (54 - 54)  BP: 115/60 (20 @ 11:00) (115/60 - 115/60)  RR: 16 (20 @ 11:00) (16 - 16)  SpO2: --      Neurologic Examination:  General:  Appearance is consistent with chronologic age.  No abnormal facies.   General: The patient is oriented to person, place, time and date.  Recent and remote memory intact.  Follows 4-step directions. Fund of knowledge is intact and normal. Language is slow with normal repetition, occasional difficulty with word finding.     Cranial nerves: EOMI w/o nystagmus, skew or reported double vision.  PERRL.  No ptosis/weakness of eyelid closure.  Facial sensation is normal with normal bite.  Minimal facial asymmetry.  Hearing grossly intact b/l.  Palate elevates midline.  Tongue midline.  Motor examination:   Normal tone, bulk and range of motion.  No tenderness, twitching, tremors or involuntary movements.  Formal Muscle Strength Testin/5 UE; 5/5 LE.  No observable drift.  Reflexes:   2+ b/l   Sensory examination:   Intact to light touch and pinprick, pain, temperature and proprioception and vibration in all extremities.  Cerebellum:   FTN/HKS intact with normal ALEXIA in all limbs.  No dysmetria or dysdiadokinesia.  Gait narrow based and normal.          VEEG  - 2018 - left hemispheric focal slowing, left breach artifact,, large number of left temporal sharps and spikes  REEG 2018 - left mid-frontal slowing, frequent left frontal and frontopolar spikes and after going slow waves.  VEEG  - 2013 - left FT slowing  VEEG  - 2012 - 2 nocturnal electrographical partial seizures form Left FT area with secondary generalization, Left FT sharp transients, Left FT slowing      Assessment:  This is a 48yo Male with h/o refractory epilepsy, Left mesial temporal sclerosis, Left anterior temporal lobectomy in , mood disorder, who self-discontinued AED over a year ago. Patient reports episodes of waking up from sleep with bladder incontinence and feeling achy, most recent event 3 weeks ago      Discussed with Dr. Saenz      Plan:   - VEEG for better characterization and treatment plan  - Seizure/safety precautions  - No AED for now  - Ativan 2mg IV PRN for generalized tonic-clonic seizure lasting longer than 2 minutes, or two consecutive seizures without return to baseline in-between (ordered)  - CBC, CMP, Mg (ordered)  - Keep Mg above 2    Plan discussed with patient in details, all questions answered.    Discussed with PA.

## 2020-02-05 NOTE — H&P ADULT - NSICDXPASTMEDICALHX_GEN_ALL_CORE_FT
PAST MEDICAL HISTORY:  Partial symptomatic epilepsy with simple partial seizures, not intractable, without status epilepticus

## 2020-02-06 PROCEDURE — 95720 EEG PHY/QHP EA INCR W/VEEG: CPT

## 2020-02-06 PROCEDURE — 99233 SBSQ HOSP IP/OBS HIGH 50: CPT

## 2020-02-06 PROCEDURE — 99231 SBSQ HOSP IP/OBS SF/LOW 25: CPT

## 2020-02-06 RX ADMIN — ENOXAPARIN SODIUM 40 MILLIGRAM(S): 100 INJECTION SUBCUTANEOUS at 11:06

## 2020-02-06 NOTE — PROGRESS NOTE ADULT - SUBJECTIVE AND OBJECTIVE BOX
Epilepsy Attending Note:     KESHAV MONROE    49y Male  MRN [id number]    Vital Signs Last 24 Hrs  T(C): 36.4 (06 Feb 2020 05:08), Max: 36.6 (05 Feb 2020 21:18)  T(F): 97.6 (06 Feb 2020 05:08), Max: 97.8 (05 Feb 2020 21:18)  HR: 55 (06 Feb 2020 05:08) (53 - 55)  BP: 110/62 (06 Feb 2020 05:08) (110/62 - 115/60)  BP(mean): --  RR: 16 (06 Feb 2020 05:08) (16 - 16)  SpO2: --                          13.4   3.53  )-----------( 202      ( 05 Feb 2020 19:53 )             42.3       02-05    140  |  102  |  9<L>  ----------------------------<  91  4.1   |  26  |  1.0    Ca    9.1      05 Feb 2020 19:53  Phos  3.2     02-05  Mg     2.1     02-05    TPro  6.9  /  Alb  4.3  /  TBili  0.5  /  DBili  x   /  AST  14  /  ALT  13  /  AlkPhos  57  02-05      MEDICATIONS  (STANDING):  enoxaparin Injectable 40 milliGRAM(s) SubCutaneous daily    MEDICATIONS  (PRN):  LORazepam   Injectable 2 milliGRAM(s) IV Push three times a day PRN generalized tonic-clonic seizure lasting longer than 2 minutes            VEEG in the last 24 hours:    Background--------8-9 hz    Focal and generalized slowing---borderline to mild left hemispheric focal slowing and breach artifact    Interictal activity--------large number of left FT spikes     Events---none    Seizures---none    Impression:  abnormal as above    Plan - continue the monitoring          seizure precaution          HV/PS

## 2020-02-06 NOTE — PROGRESS NOTE ADULT - SUBJECTIVE AND OBJECTIVE BOX
INTERVAL HPI/OVERNIGHT EVENTS:    SUBJECTIVE: Patient seen and examined at bedside.     no cp, sob, abd pain, fever  no syncope, lightheadedness, dizziness, ha    OBJECTIVE:    VITAL SIGNS:  Vital Signs Last 24 Hrs  T(C): 36.4 (06 Feb 2020 05:08), Max: 36.6 (05 Feb 2020 21:18)  T(F): 97.6 (06 Feb 2020 05:08), Max: 97.8 (05 Feb 2020 21:18)  HR: 55 (06 Feb 2020 05:08) (53 - 55)  BP: 110/62 (06 Feb 2020 05:08) (110/62 - 114/65)  BP(mean): --  RR: 16 (06 Feb 2020 05:08) (16 - 16)  SpO2: --      PHYSICAL EXAM:    General: NAD, psychomotor slowing  HEENT: NC/AT; PERRL, clear conjunctiva  Neck: supple  Respiratory: CTA b/l  Cardiovascular: +S1/S2; RRR  Abdomen: soft, NT/ND; +BS x4  Extremities: WWP, 2+ peripheral pulses b/l; no LE edema  Skin: normal color and turgor; no rash  Neurological:    MEDICATIONS:  MEDICATIONS  (STANDING):  enoxaparin Injectable 40 milliGRAM(s) SubCutaneous daily    MEDICATIONS  (PRN):  LORazepam   Injectable 2 milliGRAM(s) IV Push three times a day PRN generalized tonic-clonic seizure lasting longer than 2 minutes      ALLERGIES:  Allergies    No Known Allergies    Intolerances        LABS:                        13.4   3.53  )-----------( 202      ( 05 Feb 2020 19:53 )             42.3     Hemoglobin: 13.4 g/dL (02-05 @ 19:53)    CBC Full  -  ( 05 Feb 2020 19:53 )  WBC Count : 3.53 K/uL  RBC Count : 4.85 M/uL  Hemoglobin : 13.4 g/dL  Hematocrit : 42.3 %  Platelet Count - Automated : 202 K/uL  Mean Cell Volume : 87.2 fL  Mean Cell Hemoglobin : 27.6 pg  Mean Cell Hemoglobin Concentration : 31.7 g/dL  Auto Neutrophil # : x  Auto Lymphocyte # : x  Auto Monocyte # : x  Auto Eosinophil # : x  Auto Basophil # : x  Auto Neutrophil % : x  Auto Lymphocyte % : x  Auto Monocyte % : x  Auto Eosinophil % : x  Auto Basophil % : x    02-05    140  |  102  |  9<L>  ----------------------------<  91  4.1   |  26  |  1.0    Ca    9.1      05 Feb 2020 19:53  Phos  3.2     02-05  Mg     2.1     02-05    TPro  6.9  /  Alb  4.3  /  TBili  0.5  /  DBili  x   /  AST  14  /  ALT  13  /  AlkPhos  57  02-05    Creatinine Trend: 1.0<--  LIVER FUNCTIONS - ( 05 Feb 2020 19:53 )  Alb: 4.3 g/dL / Pro: 6.9 g/dL / ALK PHOS: 57 U/L / ALT: 13 U/L / AST: 14 U/L / GGT: x               hs Troponin:              CSF:                      EKG:   MICROBIOLOGY:    IMAGING:      Labs, imaging, EKG personally reviewed    RADIOLOGY & ADDITIONAL TESTS: Reviewed.

## 2020-02-06 NOTE — PROGRESS NOTE ADULT - ASSESSMENT
49M PMHx seizure disorder s/p L temporal lobectomy 2000, mood disorder here for elective veeg.    #Seizure disorder  currently off aed prior to admission  c/o intermittent urinary incontinence at night, grogginess upon waking  veeg per neuro; demonstrating L frontotemporal spikes  cont veeg  seizure precautions  #Mood disorder  f/u outpt  #DVT ppx  lovenox    #Progress Note Handoff:  Pending (specify):  Consults_________, Tests________, Test Results_______, Other___veeg______  Family discussion:d/w pt at bedside re: treatment plan, primary dx  Disposition: Home__x_/SNF___/Other________/Unknown at this time________

## 2020-02-07 ENCOUNTER — TRANSCRIPTION ENCOUNTER (OUTPATIENT)
Age: 50
End: 2020-02-07

## 2020-02-07 VITALS
HEART RATE: 54 BPM | SYSTOLIC BLOOD PRESSURE: 111 MMHG | RESPIRATION RATE: 16 BRPM | TEMPERATURE: 98 F | DIASTOLIC BLOOD PRESSURE: 63 MMHG

## 2020-02-07 LAB
ANION GAP SERPL CALC-SCNC: 10 MMOL/L — SIGNIFICANT CHANGE UP (ref 7–14)
BUN SERPL-MCNC: 13 MG/DL — SIGNIFICANT CHANGE UP (ref 10–20)
CALCIUM SERPL-MCNC: 9.5 MG/DL — SIGNIFICANT CHANGE UP (ref 8.5–10.1)
CHLORIDE SERPL-SCNC: 104 MMOL/L — SIGNIFICANT CHANGE UP (ref 98–110)
CO2 SERPL-SCNC: 28 MMOL/L — SIGNIFICANT CHANGE UP (ref 17–32)
CREAT SERPL-MCNC: 1 MG/DL — SIGNIFICANT CHANGE UP (ref 0.7–1.5)
GLUCOSE SERPL-MCNC: 88 MG/DL — SIGNIFICANT CHANGE UP (ref 70–99)
HCT VFR BLD CALC: 45.9 % — SIGNIFICANT CHANGE UP (ref 42–52)
HGB BLD-MCNC: 14.4 G/DL — SIGNIFICANT CHANGE UP (ref 14–18)
MAGNESIUM SERPL-MCNC: 2 MG/DL — SIGNIFICANT CHANGE UP (ref 1.8–2.4)
MCHC RBC-ENTMCNC: 27.6 PG — SIGNIFICANT CHANGE UP (ref 27–31)
MCHC RBC-ENTMCNC: 31.4 G/DL — LOW (ref 32–37)
MCV RBC AUTO: 87.9 FL — SIGNIFICANT CHANGE UP (ref 80–94)
NRBC # BLD: 0 /100 WBCS — SIGNIFICANT CHANGE UP (ref 0–0)
PHOSPHATE SERPL-MCNC: 3 MG/DL — SIGNIFICANT CHANGE UP (ref 2.1–4.9)
PLATELET # BLD AUTO: 211 K/UL — SIGNIFICANT CHANGE UP (ref 130–400)
POTASSIUM SERPL-MCNC: 4.6 MMOL/L — SIGNIFICANT CHANGE UP (ref 3.5–5)
POTASSIUM SERPL-SCNC: 4.6 MMOL/L — SIGNIFICANT CHANGE UP (ref 3.5–5)
RBC # BLD: 5.22 M/UL — SIGNIFICANT CHANGE UP (ref 4.7–6.1)
RBC # FLD: 11.6 % — SIGNIFICANT CHANGE UP (ref 11.5–14.5)
SODIUM SERPL-SCNC: 142 MMOL/L — SIGNIFICANT CHANGE UP (ref 135–146)
WBC # BLD: 4.25 K/UL — LOW (ref 4.8–10.8)
WBC # FLD AUTO: 4.25 K/UL — LOW (ref 4.8–10.8)

## 2020-02-07 PROCEDURE — 99239 HOSP IP/OBS DSCHRG MGMT >30: CPT

## 2020-02-07 PROCEDURE — 99232 SBSQ HOSP IP/OBS MODERATE 35: CPT

## 2020-02-07 PROCEDURE — 95720 EEG PHY/QHP EA INCR W/VEEG: CPT

## 2020-02-07 RX ORDER — ESLICARBAZEPINE ACETATE 800 MG/1
1 TABLET ORAL
Qty: 60 | Refills: 3
Start: 2020-02-07 | End: 2020-06-05

## 2020-02-07 NOTE — PROGRESS NOTE ADULT - SUBJECTIVE AND OBJECTIVE BOX
Epilepsy NP Note:    Follow up neurology appointment is scheduled with Dr. Saenz  for April 15, 2020 at 4 pm.    [address] [place] [address], [address]  [phone]    Rx for AED sent to the pharmacy:  Aptiom 200mg QHS x 7 days, then 300mg QHS x 7 days, then 400mg QHS    Patient was also given Rx for CBC, CMP, Aptiom level trough to be done prior to follow up.    Detailed written and verbal instructions regarding seizure precautions and follow up plan are given to the patient, all questions answered. Patient verbalized understanding.    Discussed with hospitalist.

## 2020-02-07 NOTE — PROGRESS NOTE ADULT - SUBJECTIVE AND OBJECTIVE BOX
Epilepsy Attending Note:     KESHAV MONROE    49y Male  MRN [id number]    Vital Signs Last 24 Hrs  T(C): 36.6 (07 Feb 2020 05:19), Max: 36.7 (06 Feb 2020 14:18)  T(F): 97.9 (07 Feb 2020 05:19), Max: 98 (06 Feb 2020 14:18)  HR: 54 (07 Feb 2020 05:19) (54 - 64)  BP: 111/63 (07 Feb 2020 05:19) (111/63 - 120/75)  BP(mean): --  RR: 16 (07 Feb 2020 05:19) (16 - 16)  SpO2: --                          14.4   4.25  )-----------( 211      ( 07 Feb 2020 08:14 )             45.9       02-07    142  |  104  |  13  ----------------------------<  88  4.6   |  28  |  1.0    Ca    9.5      07 Feb 2020 08:14  Phos  3.0     02-07  Mg     2.0     02-07    TPro  6.9  /  Alb  4.3  /  TBili  0.5  /  DBili  x   /  AST  14  /  ALT  13  /  AlkPhos  57  02-05      MEDICATIONS  (STANDING):  enoxaparin Injectable 40 milliGRAM(s) SubCutaneous daily    MEDICATIONS  (PRN):  LORazepam   Injectable 2 milliGRAM(s) IV Push three times a day PRN generalized tonic-clonic seizure lasting longer than 2 minutes            VEEG in the last 24 hours:    Background-----------8-9 hz    Focal and generalized slowing------left focal slowing and left breach artifact                                                         Interictal activity-------moderate number of left FT spikes    Events----------------none    Seizures-------------none    Impression:   S/P  left  anterior temporal lobectomy for refractory seizures                      Abnormal EEG as above                      mood disorder                      R/O sleep disorder    Plan -  the findings and the impression were discussed with him in extend            will start Aptiiom at 150 hs increase by 150 every week to [id number] hs           F/U with CMP and level in 10-12 weeks Epilepsy Attending Note:     KESHAV MONROE    49y Male  MRN [id number]    Vital Signs Last 24 Hrs  T(C): 36.6 (07 Feb 2020 05:19), Max: 36.7 (06 Feb 2020 14:18)  T(F): 97.9 (07 Feb 2020 05:19), Max: 98 (06 Feb 2020 14:18)  HR: 54 (07 Feb 2020 05:19) (54 - 64)  BP: 111/63 (07 Feb 2020 05:19) (111/63 - 120/75)  BP(mean): --  RR: 16 (07 Feb 2020 05:19) (16 - 16)  SpO2: --                          14.4   4.25  )-----------( 211      ( 07 Feb 2020 08:14 )             45.9       02-07    142  |  104  |  13  ----------------------------<  88  4.6   |  28  |  1.0    Ca    9.5      07 Feb 2020 08:14  Phos  3.0     02-07  Mg     2.0     02-07    TPro  6.9  /  Alb  4.3  /  TBili  0.5  /  DBili  x   /  AST  14  /  ALT  13  /  AlkPhos  57  02-05      MEDICATIONS  (STANDING):  enoxaparin Injectable 40 milliGRAM(s) SubCutaneous daily    MEDICATIONS  (PRN):  LORazepam   Injectable 2 milliGRAM(s) IV Push three times a day PRN generalized tonic-clonic seizure lasting longer than 2 minutes            VEEG in the last 24 hours:    Background-----------8-9 hz    Focal and generalized slowing------left focal slowing and left breach artifact                                                         Interictal activity-------moderate number of left FT spikes    Events----------------none    Seizures-------------none    Impression:   S/P  left  anterior temporal lobectomy for refractory seizures                      Abnormal EEG as above                      mood disorder                      R/O sleep disorder    Plan -  the findings and the impression were discussed with him in extend            will start Aptiiom at 200 hs increase to 300 in one week and 400 in 2  weeks           F/U with CMP and level in 10-12 weeks

## 2020-02-07 NOTE — DISCHARGE NOTE NURSING/CASE MANAGEMENT/SOCIAL WORK - PATIENT PORTAL LINK FT
You can access the FollowMyHealth Patient Portal offered by [facility] by registering at the following website: [email or web address]. By joining [facility]’s FollowMyHealth portal, you will also be able to view your health information using other applications (apps) compatible with our system.

## 2020-02-07 NOTE — DISCHARGE NOTE PROVIDER - HOSPITAL COURSE
49M PMHx seizure disorder s/p L temporal lobectomy 2000, mood disorder here for elective veeg.    He self discontinued aed prior, seen by Dr. Saenz. He has been recently expieriencing nocturnal enuresis, grogginess upon waking, concerning for sz. Admitted for veeg. Veeg with L FT spikes, to be d/c on aptiom, has f/u appt set up with neuro. Stable for d/c on 2/7.        31 minutes spent on discharge planning.

## 2020-02-07 NOTE — PROGRESS NOTE ADULT - SUBJECTIVE AND OBJECTIVE BOX
INTERVAL HPI/OVERNIGHT EVENTS:    SUBJECTIVE: Patient seen and examined at bedside.     no cp, sob, abd pain, fever  no syncope, lightheadedness, ha, nausea    OBJECTIVE:    VITAL SIGNS:  Vital Signs Last 24 Hrs  T(C): 36.6 (07 Feb 2020 05:19), Max: 36.7 (06 Feb 2020 14:18)  T(F): 97.9 (07 Feb 2020 05:19), Max: 98 (06 Feb 2020 14:18)  HR: 54 (07 Feb 2020 05:19) (54 - 64)  BP: 111/63 (07 Feb 2020 05:19) (111/63 - 120/75)  BP(mean): --  RR: 16 (07 Feb 2020 05:19) (16 - 16)  SpO2: --      PHYSICAL EXAM:    General: NAD, psychomotor slowing  HEENT: NC/AT; PERRL, clear conjunctiva  Neck: supple  Respiratory: CTA b/l  Cardiovascular: +S1/S2; RRR  Abdomen: soft, NT/ND; +BS x4  Extremities: WWP, 2+ peripheral pulses b/l; no LE edema  Skin: normal color and turgor; no rash  Neurological:    MEDICATIONS:  MEDICATIONS  (STANDING):  enoxaparin Injectable 40 milliGRAM(s) SubCutaneous daily    MEDICATIONS  (PRN):  LORazepam   Injectable 2 milliGRAM(s) IV Push three times a day PRN generalized tonic-clonic seizure lasting longer than 2 minutes      ALLERGIES:  Allergies    No Known Allergies    Intolerances        LABS:                        14.4   4.25  )-----------( 211      ( 07 Feb 2020 08:14 )             45.9     Hemoglobin: 14.4 g/dL (02-07 @ 08:14)  Hemoglobin: 13.4 g/dL (02-05 @ 19:53)    CBC Full  -  ( 07 Feb 2020 08:14 )  WBC Count : 4.25 K/uL  RBC Count : 5.22 M/uL  Hemoglobin : 14.4 g/dL  Hematocrit : 45.9 %  Platelet Count - Automated : 211 K/uL  Mean Cell Volume : 87.9 fL  Mean Cell Hemoglobin : 27.6 pg  Mean Cell Hemoglobin Concentration : 31.4 g/dL  Auto Neutrophil # : x  Auto Lymphocyte # : x  Auto Monocyte # : x  Auto Eosinophil # : x  Auto Basophil # : x  Auto Neutrophil % : x  Auto Lymphocyte % : x  Auto Monocyte % : x  Auto Eosinophil % : x  Auto Basophil % : x    02-07    142  |  104  |  13  ----------------------------<  88  4.6   |  28  |  1.0    Ca    9.5      07 Feb 2020 08:14  Phos  3.0     02-07  Mg     2.0     02-07    TPro  6.9  /  Alb  4.3  /  TBili  0.5  /  DBili  x   /  AST  14  /  ALT  13  /  AlkPhos  57  02-05    Creatinine Trend: 1.0<--, 1.0<--  LIVER FUNCTIONS - ( 05 Feb 2020 19:53 )  Alb: 4.3 g/dL / Pro: 6.9 g/dL / ALK PHOS: 57 U/L / ALT: 13 U/L / AST: 14 U/L / GGT: x               hs Troponin:              CSF:                      EKG:   MICROBIOLOGY:    IMAGING:      Labs, imaging, EKG personally reviewed    RADIOLOGY & ADDITIONAL TESTS: Reviewed.

## 2020-02-07 NOTE — DISCHARGE NOTE PROVIDER - CARE PROVIDER_API CALL
Roberto Saenz)  Neurology  [address] [place] [address], [address] [place] [address]  Phone: [phone]  Fax: [phone]  Follow Up Time:

## 2020-02-07 NOTE — DISCHARGE NOTE PROVIDER - NSDCMRMEDTOKEN_GEN_ALL_CORE_FT
Aptiom 200 mg oral tablet: 1 tablet at night x 7 days, then one and a half tablet at night x 7 days, then increase to 2 tablets at night and continue

## 2020-02-07 NOTE — PROGRESS NOTE ADULT - ASSESSMENT
49M PMHx seizure disorder s/p L temporal lobectomy 2000, mood disorder here for elective veeg.    #Seizure disorder  currently off aed prior to admission  c/o intermittent urinary incontinence at night, grogginess upon waking  veeg  demonstrating L frontotemporal spikes  d/w neuro, stable for d/c today on aptiom; has f/u appt set up with Dr. Saenz  #Mood disorder  f/u outpt  #DVT ppx  lovenox

## 2020-02-07 NOTE — DISCHARGE NOTE PROVIDER - CARE PROVIDERS DIRECT ADDRESSES
[email or web address]

## 2020-02-07 NOTE — DISCHARGE NOTE PROVIDER - NSDCCPCAREPLAN_GEN_ALL_CORE_FT
PRINCIPAL DISCHARGE DIAGNOSIS  Diagnosis: Seizure disorder  Assessment and Plan of Treatment: PLEASE TAKE MEDICATIONS AS PRESCRIBED AND FOLLOW UP WITH DR. JETT.

## 2020-02-11 DIAGNOSIS — G40.909 EPILEPSY, UNSPECIFIED, NOT INTRACTABLE, WITHOUT STATUS EPILEPTICUS: ICD-10-CM

## 2020-02-11 DIAGNOSIS — R32 UNSPECIFIED URINARY INCONTINENCE: ICD-10-CM

## 2020-02-11 DIAGNOSIS — F39 UNSPECIFIED MOOD [AFFECTIVE] DISORDER: ICD-10-CM

## 2020-02-11 DIAGNOSIS — Z82.0 FAMILY HISTORY OF EPILEPSY AND OTHER DISEASES OF THE NERVOUS SYSTEM: ICD-10-CM

## 2020-03-16 RX ORDER — ESLICARBAZEPINE ACETATE 800 MG/1
1 TABLET ORAL
Qty: 90 | Refills: 3
Start: 2020-03-16 | End: 2021-03-10

## 2020-03-16 RX ORDER — ESLICARBAZEPINE ACETATE 800 MG/1
1 TABLET ORAL
Qty: 30 | Refills: 5
Start: 2020-03-16 | End: 2020-09-11

## 2020-04-15 ENCOUNTER — APPOINTMENT (OUTPATIENT)
Dept: NEUROLOGY | Facility: CLINIC | Age: 50
End: 2020-04-15
Payer: COMMERCIAL

## 2020-04-15 PROCEDURE — 99446 NTRPROF PH1/NTRNET/EHR 5-10: CPT

## 2020-04-15 PROCEDURE — 99441: CPT

## 2020-08-28 RX ORDER — ESLICARBAZEPINE ACETATE 600 MG/1
600 TABLET ORAL
Qty: 90 | Refills: 2 | Status: DISCONTINUED | COMMUNITY
Start: 2020-07-10 | End: 2020-08-28

## 2020-09-25 ENCOUNTER — APPOINTMENT (OUTPATIENT)
Dept: NEUROLOGY | Facility: CLINIC | Age: 50
End: 2020-09-25
Payer: COMMERCIAL

## 2020-09-25 VITALS
SYSTOLIC BLOOD PRESSURE: 122 MMHG | BODY MASS INDEX: 30.8 KG/M2 | DIASTOLIC BLOOD PRESSURE: 70 MMHG | HEIGHT: 73 IN | WEIGHT: 232.4 LBS | HEART RATE: 67 BPM | TEMPERATURE: 97.9 F | OXYGEN SATURATION: 96 %

## 2020-09-25 PROCEDURE — 99214 OFFICE O/P EST MOD 30 MIN: CPT

## 2020-09-25 RX ORDER — OXCARBAZEPINE 300 MG/1
300 TABLET, FILM COATED ORAL TWICE DAILY
Qty: 270 | Refills: 2 | Status: DISCONTINUED | COMMUNITY
Start: 2020-08-28 | End: 2020-09-25

## 2020-09-25 NOTE — HISTORY OF PRESENT ILLNESS
[FreeTextEntry1] : Tate is here for the F/U . HE called the day before yesterday. reporting waking up finding himself wet  and achy . This is his usual feeling after having a nocturnal seizure. He says he did well after starting the Aptiom.had no events and in fact feels being more motivated and having a better moods.\par He is continuing to have his regular job that is 6.30 to 2.30 and his part time job as a  between 3-11 pm .\par His sleep pattern on the weekdays is slightly off or not adequate .\par He says occasionally does not feel mentally sharp and in fact feels foggy.\par Overall even I can see a difference. he appears more motivated, less tired .and positive towrds taking the med.\par The only issue is that he has been taking only 600 mg of Aptiom instead of 900(1 & 1/2 of 600 mg tab)\par He visits his son . has some issues with his X.

## 2020-09-25 NOTE — PHYSICAL EXAM
[FreeTextEntry1] : A/A/Ox3\par good mood\par more energetic and spontaneous\par good attention and concentration\par good language and executive behavior\par CN- intact\par motor exam is normal\par stable gait\par no dysmetria\par

## 2020-09-25 NOTE — ASSESSMENT
[FreeTextEntry1] : s/p left temporal lobectomy for refractory epilepsy\par Left partial seizure\par mild mood D/O\par \par plan\par \par increase the dose to one and a half of Aptiom 600\par blood level in a week\par F/U

## 2021-04-02 ENCOUNTER — OUTPATIENT (OUTPATIENT)
Dept: OUTPATIENT SERVICES | Facility: HOSPITAL | Age: 51
LOS: 1 days | Discharge: HOME | End: 2021-04-02

## 2021-04-02 ENCOUNTER — LABORATORY RESULT (OUTPATIENT)
Age: 51
End: 2021-04-02

## 2021-04-02 DIAGNOSIS — Z11.59 ENCOUNTER FOR SCREENING FOR OTHER VIRAL DISEASES: ICD-10-CM

## 2021-04-02 DIAGNOSIS — D33.2 BENIGN NEOPLASM OF BRAIN, UNSPECIFIED: Chronic | ICD-10-CM

## 2021-04-05 ENCOUNTER — INPATIENT (INPATIENT)
Facility: HOSPITAL | Age: 51
LOS: 2 days | Discharge: HOME | End: 2021-04-08
Attending: INTERNAL MEDICINE | Admitting: HOSPITALIST
Payer: COMMERCIAL

## 2021-04-05 VITALS
RESPIRATION RATE: 16 BRPM | HEART RATE: 52 BPM | DIASTOLIC BLOOD PRESSURE: 77 MMHG | TEMPERATURE: 97 F | SYSTOLIC BLOOD PRESSURE: 109 MMHG

## 2021-04-05 DIAGNOSIS — D33.2 BENIGN NEOPLASM OF BRAIN, UNSPECIFIED: Chronic | ICD-10-CM

## 2021-04-05 DIAGNOSIS — G40.109 LOCALIZATION-RELATED (FOCAL) (PARTIAL) SYMPTOMATIC EPILEPSY AND EPILEPTIC SYNDROMES WITH SIMPLE PARTIAL SEIZURES, NOT INTRACTABLE, WITHOUT STATUS EPILEPTICUS: ICD-10-CM

## 2021-04-05 LAB
ALBUMIN SERPL ELPH-MCNC: 4.3 G/DL — SIGNIFICANT CHANGE UP (ref 3.5–5.2)
ALP SERPL-CCNC: 61 U/L — SIGNIFICANT CHANGE UP (ref 30–115)
ALT FLD-CCNC: 12 U/L — SIGNIFICANT CHANGE UP (ref 0–41)
ANION GAP SERPL CALC-SCNC: 8 MMOL/L — SIGNIFICANT CHANGE UP (ref 7–14)
APTT BLD: 29.9 SEC — SIGNIFICANT CHANGE UP (ref 27–39.2)
AST SERPL-CCNC: 12 U/L — SIGNIFICANT CHANGE UP (ref 0–41)
BASOPHILS # BLD AUTO: 0.03 K/UL — SIGNIFICANT CHANGE UP (ref 0–0.2)
BASOPHILS NFR BLD AUTO: 0.5 % — SIGNIFICANT CHANGE UP (ref 0–1)
BILIRUB SERPL-MCNC: 0.5 MG/DL — SIGNIFICANT CHANGE UP (ref 0.2–1.2)
BUN SERPL-MCNC: 9 MG/DL — LOW (ref 10–20)
CALCIUM SERPL-MCNC: 9.9 MG/DL — SIGNIFICANT CHANGE UP (ref 8.5–10.1)
CHLORIDE SERPL-SCNC: 104 MMOL/L — SIGNIFICANT CHANGE UP (ref 98–110)
CO2 SERPL-SCNC: 29 MMOL/L — SIGNIFICANT CHANGE UP (ref 17–32)
CREAT SERPL-MCNC: 1.1 MG/DL — SIGNIFICANT CHANGE UP (ref 0.7–1.5)
EOSINOPHIL # BLD AUTO: 0.12 K/UL — SIGNIFICANT CHANGE UP (ref 0–0.7)
EOSINOPHIL NFR BLD AUTO: 2.1 % — SIGNIFICANT CHANGE UP (ref 0–8)
GLUCOSE SERPL-MCNC: 57 MG/DL — LOW (ref 70–99)
HCT VFR BLD CALC: 41.3 % — LOW (ref 42–52)
HGB BLD-MCNC: 12.8 G/DL — LOW (ref 14–18)
IMM GRANULOCYTES NFR BLD AUTO: 0.2 % — SIGNIFICANT CHANGE UP (ref 0.1–0.3)
INR BLD: 1.13 RATIO — SIGNIFICANT CHANGE UP (ref 0.65–1.3)
LYMPHOCYTES # BLD AUTO: 2.23 K/UL — SIGNIFICANT CHANGE UP (ref 1.2–3.4)
LYMPHOCYTES # BLD AUTO: 38.8 % — SIGNIFICANT CHANGE UP (ref 20.5–51.1)
MAGNESIUM SERPL-MCNC: 1.9 MG/DL — SIGNIFICANT CHANGE UP (ref 1.8–2.4)
MCHC RBC-ENTMCNC: 27.6 PG — SIGNIFICANT CHANGE UP (ref 27–31)
MCHC RBC-ENTMCNC: 31 G/DL — LOW (ref 32–37)
MCV RBC AUTO: 89 FL — SIGNIFICANT CHANGE UP (ref 80–94)
MONOCYTES # BLD AUTO: 0.38 K/UL — SIGNIFICANT CHANGE UP (ref 0.1–0.6)
MONOCYTES NFR BLD AUTO: 6.6 % — SIGNIFICANT CHANGE UP (ref 1.7–9.3)
NEUTROPHILS # BLD AUTO: 2.98 K/UL — SIGNIFICANT CHANGE UP (ref 1.4–6.5)
NEUTROPHILS NFR BLD AUTO: 51.8 % — SIGNIFICANT CHANGE UP (ref 42.2–75.2)
NRBC # BLD: 0 /100 WBCS — SIGNIFICANT CHANGE UP (ref 0–0)
PHOSPHATE SERPL-MCNC: 3.2 MG/DL — SIGNIFICANT CHANGE UP (ref 2.1–4.9)
PLATELET # BLD AUTO: 224 K/UL — SIGNIFICANT CHANGE UP (ref 130–400)
POTASSIUM SERPL-MCNC: 4 MMOL/L — SIGNIFICANT CHANGE UP (ref 3.5–5)
POTASSIUM SERPL-SCNC: 4 MMOL/L — SIGNIFICANT CHANGE UP (ref 3.5–5)
PROT SERPL-MCNC: 6.7 G/DL — SIGNIFICANT CHANGE UP (ref 6–8)
PROTHROM AB SERPL-ACNC: 13 SEC — HIGH (ref 9.95–12.87)
RBC # BLD: 4.64 M/UL — LOW (ref 4.7–6.1)
RBC # FLD: 11.9 % — SIGNIFICANT CHANGE UP (ref 11.5–14.5)
SODIUM SERPL-SCNC: 141 MMOL/L — SIGNIFICANT CHANGE UP (ref 135–146)
WBC # BLD: 5.75 K/UL — SIGNIFICANT CHANGE UP (ref 4.8–10.8)
WBC # FLD AUTO: 5.75 K/UL — SIGNIFICANT CHANGE UP (ref 4.8–10.8)

## 2021-04-05 PROCEDURE — 99222 1ST HOSP IP/OBS MODERATE 55: CPT

## 2021-04-05 PROCEDURE — 99221 1ST HOSP IP/OBS SF/LOW 40: CPT

## 2021-04-05 RX ORDER — CHLORHEXIDINE GLUCONATE 213 G/1000ML
1 SOLUTION TOPICAL
Refills: 0 | Status: DISCONTINUED | OUTPATIENT
Start: 2021-04-05 | End: 2021-04-08

## 2021-04-05 NOTE — H&P ADULT - NSHPPHYSICALEXAM_GEN_ALL_CORE
PHYSICAL EXAM:      Constitutional: NAD, A&O x3    Eyes: PERRLA, no conjuctivitis    Neck: no lymphadenopathy    Respiratory: +air entry, no rales, no rhonchi, no wheezes    Cardiovascular: +S1 and S2, regular rate and rhythm    Gastrointestinal: +BS, soft, non-tender, not distended    Extremities:  no edema, no calf tenderness    Skin: no rashes, normal turgor    Vitals pending Vital Signs Last 24 Hrs  T(C): 36.3 (05 Apr 2021 13:15), Max: 36.3 (05 Apr 2021 13:15)  T(F): 97.4 (05 Apr 2021 13:15), Max: 97.4 (05 Apr 2021 13:15)  HR: 52 (05 Apr 2021 13:15) (52 - 52)  BP: 109/77 (05 Apr 2021 13:15) (109/77 - 109/77)  BP(mean): --  RR: 16 (05 Apr 2021 13:15) (16 - 16)  SpO2: --    PHYSICAL EXAM:  Constitutional: NAD, A&O x3    Eyes: PERRLA, no conjunctivitis    Neck: no lymphadenopathy    Respiratory: +air entry, no rales, no rhonchi, no wheezes    Cardiovascular: +S1 and S2, regular rate and rhythm    Gastrointestinal: +BS, soft, non-tender, not distended    Extremities:  no edema, no calf tenderness    Skin: no rashes, normal turgor

## 2021-04-05 NOTE — H&P ADULT - NSHPREVIEWOFSYSTEMS_GEN_ALL_CORE
Denies HA, dizziness,, fever/chills, cough, rhinorrhea, SOB, chest pain, NVD, abdominal pain, change in urination and change in BM.

## 2021-04-05 NOTE — H&P ADULT - ASSESSMENT
ASSESSMENT: Pt is a 50M w/ PMH simple partial epilepsy being admitted for routine VEEG monitoring.       PLAN: Case d/w Dr. Roca  - Admit to inpatient level of care - medicine  - VEEG monitoring  - Neurology following  - F/u labs / AM labs  - Ativan PRN for status epilepticus   - Pt was on anti-epileptic Aptiom 1500mg QD, which he stopped taking about one month ago   - Per neuro, will not restart anti-epileptic at this time.   - Pt will undergo VEEG monitoring and recommendations will be made when this is complete.   - No other home medications  - Regular diet   - VTE: ambulation

## 2021-04-05 NOTE — H&P ADULT - HISTORY OF PRESENT ILLNESS
Pt is a 50M w/ PMH simple partial epilepsy being admitted for routine VEEG monitoring. Pt has been experiencing epilepsy throughout his entire life. He follows with Dr. Saenz. Pt was supposed to be taking Aptiom 1500mg QD, but took himself off of it one month ago when he noticed a decrease in his seizure activity. Pt reports having all of his seizures while asleep and often experiencing nocturnal enuresis. Unable to report quantity or length of seizures. Denies HA, dizziness,, fever/chills, cough, rhinorrhea, SOB, chest pain, NVD, abdominal pain, change in urination and change in BM.

## 2021-04-05 NOTE — CONSULT NOTE ADULT - ATTENDING COMMENTS
Agree with the Hx and the plan.  Tate is known to me for his left mesial temporal lobe refractory seizure . he is S/P left anterior /mesial temporal lobe resective surgery .  Did well for many years however starting to have recurrent seizures that are all < nocturnal >.  The frequency is not clear. He also is not compliant with his seizure medication  He is here for further characterization and treatment plan that hopefully he would be compliant with.  Will start the monitoring  seizure precaution

## 2021-04-05 NOTE — H&P ADULT - ATTENDING COMMENTS
Patient seen and examined independently  Agree with medical PA's H&P except where edited by me    Seizure disorder with breakthrough seizures secondary to non compliance with medication  -admit to veeg unit  -seizure precautions  -no AED's as per neuro  -check routine labs; keep K>4 and mg>2  -ativan for seizure lasting over two min or two consecutive seizures without a return to baseline in between

## 2021-04-05 NOTE — CONSULT NOTE ADULT - SUBJECTIVE AND OBJECTIVE BOX
Neurology/Epilepsy Consult:    KESHAV MONROE 50y Male  MRN-[id number]    Patient is a 50y old left-handed Male who presents with a chief complaint of VEEG (2021 12:23)        HPI: History obtained from patient and EMR  Patient has h/o seizures since childhood, but states was started on seizure medications at 21 yo. He was tried on various AEDs, diagnosed with Left mesial temporal sclerosis, and in  underwent Left anterior temporal lobectomy.   He was most recently admitted to our Epilepsy Unit in 2020 after reporting multiple episodes of waking up with tongue bite and bladder incontinence while off AED. Patient had VEEG (see report below) and agreed to re-start medications. He was put on Aptiom and dose was titrated as outpatient.   In mid 2021 patient decided to stop taking it and use OTC CBD oil instead. He denied having any difficulty tolerating Aptiom, just thought he may not need it any longer.  It must be noted that in the past patient self-discontinued AED several times. Also, during previous admissions for VEEG patient had nocturnal electrographical seizures that he was not aware of.    Previous AED trials: Dilantin, Lamictal, Keppra, Trileptal, Aptiom        PAST MEDICAL & SURGICAL HISTORY:  Refractory epilepsy  Left mesial temporal sclerosis  Left anterior temporal lobectomy   Mood disorder        FAMILY HISTORY:  Uncle - epilepsy        Allergy:  No Known Allergies        Home Medications:  CBD oil (OTC)  Aptiom 1500mg QHS (last taken 5-6 weeks ago)  Vitamin D 2000 IU daily  MVI        MEDICATIONS  (STANDING):  chlorhexidine 4% Liquid 1 Application(s) Topical <User Schedule>    MEDICATIONS  (PRN):  LORazepam   Injectable 2 milliGRAM(s) IV Push three times a day PRN generalized tonic-clonic seizure lasting longer than 2 minutes          T(F): 97.4 (21 @ 13:15), Max: 97.4 (21 @ 13:15)  HR: 52 (21 @ 13:15) (52 - 52)  BP: 109/77 (21 @ 13:15) (109/77 - 109/77)  RR: 16 (21 @ 13:15) (16 - 16)  SpO2: --        Neurologic Examination:  General:  Appearance is consistent with chronologic age.  No abnormal facies.   General: The patient is oriented to person, place, time and date.  Follows 4-step directions. Fund of knowledge is intact and normal.  Language is slow with normal repetition, comprehension and naming.  Nondysarthric.    Cranial nerves: EOMI w/o nystagmus, skew or reported double vision.  PERRL.  No ptosis/weakness of eyelid closure.  Facial sensation is normal with normal bite.  No facial asymmetry.  Hearing grossly intact b/l.  Palate elevates midline.  Tongue midline.  Motor examination:   Normal tone, bulk and range of motion.  No tenderness, twitching, tremors or involuntary movements.  Formal Muscle Strength Testin/5 UE; 5/5 LE.  No observable drift.  Reflexes:   2+ b/l   Sensory examination:   Intact to light touch and pinprick, pain, temperature.  Cerebellum:   FTN/HKS intact with normal ALEXIA in all limbs.  No dysmetria or dysdiadokinesia.  Gait narrow based and normal.        Labs:   2021 COVID-19 PCR negative  2021 Eclicarbazepine 23.1 mcg/ml (random level)  10/31/2020 Vitamin D 23 ng/ml, eslicarbazepine 9.5 mcg/ml          Neuroimaging:  CT Head:   CT Angiography/Perfusion:   MRI Brain:   MRA Head/Neck:     Carotid US:       VEEG  - 2020 - borderline to mile left hemispheric focal slowing and breach artifact, large number of left FT spikes  VEEG  - 2018 - left hemispheric focal slowing, left breach artifact,, large number of left temporal sharps and spikes  REEG 2018 - left mid-frontal slowing, frequent left frontal and frontopolar spikes and after going slow waves.  VEEG  - 2013 - left FT slowing  VEEG  - 2012 - 2 nocturnal electrographical partial seizures form Left FT area with secondary generalization, Left FT sharp transients, Left FT slowing        Assessment:  This is a 48yo Male with h/o refractory epilepsy, Left mesial temporal sclerosis, Left anterior temporal lobectomy, mood disorder, who self-discontinued Aptiom about 1.5 month ago. Patient continues having episodes of waking up from sleep with bladder incontinence, tongue bite, and feeling achy. Most recent reported event 2021 when he was evaluated in the ED at [facility]. He is currently taking OTC CBD oil.        Discussed with Dr. Saenz      Plan:   - VEEG monitoring for further characterization and treatment plan  - Seizure precautions  - Will not re-start AED yet  - Ativan 2mg IV PRN for generalized tonic-clonic seizure lasting longer than 2 minutes, or two consecutive seizures without return to baseline in-between (ordered)  - CBC, CMP, Mg  - Keep Mg above 2    Plan discussed with patient in details, all questions answered.    Discussed with PA and nursing team. Neurology/Epilepsy Consult:    KESHAV MONROE 50y Male  MRN-[id number]    Patient is a 50y old left-handed Male who presents for elective VEEG         HPI: History obtained from patient and EMR  Patient has h/o seizures since childhood, but states was not started on seizure medications until 21 yo. He was tried on various AEDs, diagnosed with Left mesial temporal sclerosis, and in  underwent Left anterior temporal lobectomy.   He was most recently admitted to our Epilepsy Unit in 2020 after reporting multiple episodes of waking up with tongue bite and bladder incontinence while off AED (self-discontinued). Patient had VEEG (see report below) and agreed to re-start medications. He was put on Aptiom and dose was titrated as outpatient.   In mid 2021 patient decided to stop taking Aptiom and use OTC CBD oil instead. He denied having any difficulty tolerating Aptiom, just thought he may not need it any longer. On 2021 he woke up feeling achy with left arm pain and body aches, + bladder incontinence. He was evaluated in the ED at [facility], felt better in several hours and discharged.  It must be noted that in the past patient self-discontinued AED multiple times. Also, during previous admissions for VEEG patient had nocturnal electrographical seizures that he was not aware of.  Patient is being followed as outpatient by Dr. Saenz.    Previous AED trials: Dilantin, Lamictal, Keppra, Trileptal, Aptiom        PAST MEDICAL & SURGICAL HISTORY:  Refractory epilepsy  Left mesial temporal sclerosis  Left anterior temporal lobectomy   Mood disorder        FAMILY HISTORY:  Uncle - epilepsy        Allergy:  No Known Allergies        Home Medications:  CBD oil (OTC)  Aptiom 1500mg QHS (last taken 5-6 weeks ago)  Vitamin D 2000 IU daily  MVI        MEDICATIONS  (STANDING):  chlorhexidine 4% Liquid 1 Application(s) Topical <User Schedule>    MEDICATIONS  (PRN):  LORazepam   Injectable 2 milliGRAM(s) IV Push three times a day PRN generalized tonic-clonic seizure lasting longer than 2 minutes          T(F): 97.4 (21 @ 13:15), Max: 97.4 (21 @ 13:15)  HR: 52 (21 @ 13:15) (52 - 52)  BP: 109/77 (21 @ 13:15) (109/77 - 109/77)  RR: 16 (21 @ 13:15) (16 - 16)  SpO2: --        Neurologic Examination:  General:  Appearance is consistent with chronologic age.  No abnormal facies.   General: The patient is oriented to person, place, time and date.  Follows 4-step directions. Fund of knowledge is intact and normal.  Language is slow with normal repetition, comprehension and naming.  Nondysarthric.    Cranial nerves: EOMI w/o nystagmus, skew or reported double vision.  PERRL.  No ptosis/weakness of eyelid closure.  Facial sensation is normal with normal bite.  No facial asymmetry.  Hearing grossly intact b/l.  Palate elevates midline.  Tongue midline.  Motor examination:   Normal tone, bulk and range of motion.  No tenderness, twitching, tremors or involuntary movements.  Formal Muscle Strength Testin/5 RUE; 5-/5 LUE; 5/5 LE x 2.    Reflexes:   2+ b/l   Sensory examination:   Intact to light touch and pinprick, pain, temperature.  Cerebellum:   FTN/HKS intact with normal ALEXIA in all limbs.  No dysmetria or dysdiadokinesia.  Gait narrow based and normal.        Labs:   2021 COVID-19 PCR negative  2021 Eclicarbazepine 23.1 mcg/ml (random level)  10/31/2020 Vitamin D 23 ng/ml, eslicarbazepine 9.5 mcg/ml          Neuroimaging:  CT Head:   CT Angiography/Perfusion:   MRI Brain:   MRA Head/Neck:     Carotid US:       VEEG  - 2020 - borderline to mile left hemispheric focal slowing and breach artifact, large number of left FT spikes  VEEG  - 2018 - left hemispheric focal slowing, left breach artifact,, large number of left temporal sharps and spikes  REEG 2018 - left mid-frontal slowing, frequent left frontal and frontopolar spikes and after going slow waves.  VEEG  - 2013 - left FT slowing  VEEG  - 2012 - 2 nocturnal electrographical partial seizures form Left FT area with secondary generalization, Left FT sharp transients, Left FT slowing        Assessment:  This is a 48yo Male with h/o refractory epilepsy, Left mesial temporal sclerosis, Left anterior temporal lobectomy, mood disorder, who self-discontinued Aptiom about 1.5 month ago. Patient continues having episodes of waking up from sleep with bladder incontinence, tongue bite, and feeling achy. Most recent reported event 2021 when he was evaluated in the ED at [facility]. He is currently taking OTC CBD oil.        Discussed with Dr. Saenz      Plan:   - VEEG monitoring for further characterization and treatment plan  - Seizure precautions  - Will not re-start AED yet  - Ativan 2mg IV PRN for generalized tonic-clonic seizure lasting longer than 2 minutes, or two consecutive seizures without return to baseline in-between (ordered)  - CBC, CMP, Mg  - Keep Mg above 2    Plan discussed with patient in details, all questions answered.    Discussed with PA and nursing team. Neurology/Epilepsy Consult:    KESHAV MONROE 50y Male  MRN-[id number]    Patient is a 50y old left-handed Male who presents for elective VEEG         HPI: History obtained from patient. EMR and outpatient records reviewed.  Patient has h/o seizures since childhood, but states was not started on seizure medications until 19 yo. He was tried on various AEDs, diagnosed with Left mesial temporal sclerosis, and in  underwent Left anterior temporal lobectomy.   He was most recently admitted to our Epilepsy Unit in 2020 after reporting multiple episodes of waking up with tongue bite and bladder incontinence while off AED (self-discontinued). Patient had VEEG (see report below) and agreed to re-start medications. He was put on Aptiom and dose was titrated as outpatient.   In mid 2021 patient decided to stop taking Aptiom and use OTC CBD oil instead. He denied having any difficulty tolerating Aptiom, just thought he may not need it any longer. On 2021 he woke up feeling achy with left arm pain and body aches, + bladder incontinence. He was evaluated in the ED at [facility], felt better in several hours and discharged.  It must be noted that in the past patient self-discontinued AED multiple times. Also, during previous admissions for VEEG patient had nocturnal electrographical seizures that he was not aware of.  Patient is being followed as outpatient by Dr. Saenz.    Previous AED trials: Dilantin, Lamictal, Keppra, Trileptal, Aptiom        PAST MEDICAL & SURGICAL HISTORY:  Refractory epilepsy  Left mesial temporal sclerosis  Left anterior temporal lobectomy   Mood disorder        FAMILY HISTORY:  Uncle - epilepsy        Allergy:  No Known Allergies        Home Medications:  CBD oil (OTC)  Aptiom 1500mg QHS (last taken 5-6 weeks ago)  Vitamin D 2000 IU daily  MVI        MEDICATIONS  (STANDING):  chlorhexidine 4% Liquid 1 Application(s) Topical <User Schedule>    MEDICATIONS  (PRN):  LORazepam   Injectable 2 milliGRAM(s) IV Push three times a day PRN generalized tonic-clonic seizure lasting longer than 2 minutes          T(F): 97.4 (21 @ 13:15), Max: 97.4 (21 @ 13:15)  HR: 52 (21 @ 13:15) (52 - 52)  BP: 109/77 (21 @ 13:15) (109/77 - 109/77)  RR: 16 (21 @ 13:15) (16 - 16)  SpO2: --        Neurologic Examination:  General:  Appearance is consistent with chronologic age.  No abnormal facies.   General: The patient is oriented to person, place, time and date.  Follows 4-step directions. Fund of knowledge is intact and normal.  Language is slow with normal repetition, comprehension and naming.  Nondysarthric.    Cranial nerves: EOMI w/o nystagmus, skew or reported double vision.  PERRL.  No ptosis/weakness of eyelid closure.  Facial sensation is normal with normal bite.  No facial asymmetry.  Hearing grossly intact b/l.  Palate elevates midline.  Tongue midline.  Motor examination:   Normal tone, bulk and range of motion.  No tenderness, twitching, tremors or involuntary movements.  Formal Muscle Strength Testin/5 RUE; 5-/5 LUE; 5/5 LE x 2.    Reflexes:   2+ b/l   Sensory examination:   Intact to light touch and pinprick, pain, temperature.  Cerebellum:   FTN/HKS intact with normal ALEXIA in all limbs.  No dysmetria or dysdiadokinesia.  Gait narrow based and normal.        Labs:   2021 COVID-19 PCR negative  2021 Eclicarbazepine 23.1 mcg/ml (random level)  10/31/2020 Vitamin D 23 ng/ml, eslicarbazepine 9.5 mcg/ml          Neuroimaging:  CT Head:   CT Angiography/Perfusion:   MRI Brain:   MRA Head/Neck:     Carotid US:       VEEG  - 2020 - borderline to mile left hemispheric focal slowing and breach artifact, large number of left FT spikes  VEEG  - 2018 - left hemispheric focal slowing, left breach artifact,, large number of left temporal sharps and spikes  REEG 2018 - left mid-frontal slowing, frequent left frontal and frontopolar spikes and after going slow waves.  VEEG  - 2013 - left FT slowing  VEEG  - 2012 - 2 nocturnal electrographical partial seizures form Left FT area with secondary generalization, Left FT sharp transients, Left FT slowing        Assessment:  This is a 48yo Male with h/o refractory epilepsy, Left mesial temporal sclerosis, Left anterior temporal lobectomy, mood disorder, who self-discontinued Aptiom about 1.5 month ago. Patient continues having episodes of waking up from sleep with bladder incontinence, tongue bite, and feeling achy. Most recent reported event 2021 when he was evaluated in the ED at [facility]. He is currently taking OTC CBD oil.        Discussed with Dr. Saenz      Plan:   - VEEG monitoring for further characterization and treatment plan  - Seizure precautions  - Will not re-start AED yet  - Ativan 2mg IV PRN for generalized tonic-clonic seizure lasting longer than 2 minutes, or two consecutive seizures without return to baseline in-between (ordered)  - CBC, CMP, Mg  - Keep Mg above 2    Plan discussed with patient in details, all questions answered.    Discussed with PA and nursing team.

## 2021-04-06 LAB
ANION GAP SERPL CALC-SCNC: 7 MMOL/L — SIGNIFICANT CHANGE UP (ref 7–14)
BUN SERPL-MCNC: 9 MG/DL — LOW (ref 10–20)
CALCIUM SERPL-MCNC: 9.4 MG/DL — SIGNIFICANT CHANGE UP (ref 8.5–10.1)
CHLORIDE SERPL-SCNC: 104 MMOL/L — SIGNIFICANT CHANGE UP (ref 98–110)
CO2 SERPL-SCNC: 29 MMOL/L — SIGNIFICANT CHANGE UP (ref 17–32)
CREAT SERPL-MCNC: 1.1 MG/DL — SIGNIFICANT CHANGE UP (ref 0.7–1.5)
GLUCOSE SERPL-MCNC: 90 MG/DL — SIGNIFICANT CHANGE UP (ref 70–99)
HCT VFR BLD CALC: 42.8 % — SIGNIFICANT CHANGE UP (ref 42–52)
HGB BLD-MCNC: 13.3 G/DL — LOW (ref 14–18)
MAGNESIUM SERPL-MCNC: 2 MG/DL — SIGNIFICANT CHANGE UP (ref 1.8–2.4)
MCHC RBC-ENTMCNC: 27.8 PG — SIGNIFICANT CHANGE UP (ref 27–31)
MCHC RBC-ENTMCNC: 31.1 G/DL — LOW (ref 32–37)
MCV RBC AUTO: 89.5 FL — SIGNIFICANT CHANGE UP (ref 80–94)
NRBC # BLD: 0 /100 WBCS — SIGNIFICANT CHANGE UP (ref 0–0)
PLATELET # BLD AUTO: 213 K/UL — SIGNIFICANT CHANGE UP (ref 130–400)
POTASSIUM SERPL-MCNC: 4.7 MMOL/L — SIGNIFICANT CHANGE UP (ref 3.5–5)
POTASSIUM SERPL-SCNC: 4.7 MMOL/L — SIGNIFICANT CHANGE UP (ref 3.5–5)
RBC # BLD: 4.78 M/UL — SIGNIFICANT CHANGE UP (ref 4.7–6.1)
RBC # FLD: 11.9 % — SIGNIFICANT CHANGE UP (ref 11.5–14.5)
SODIUM SERPL-SCNC: 140 MMOL/L — SIGNIFICANT CHANGE UP (ref 135–146)
WBC # BLD: 4.54 K/UL — LOW (ref 4.8–10.8)
WBC # FLD AUTO: 4.54 K/UL — LOW (ref 4.8–10.8)

## 2021-04-06 PROCEDURE — 99232 SBSQ HOSP IP/OBS MODERATE 35: CPT

## 2021-04-06 PROCEDURE — 99231 SBSQ HOSP IP/OBS SF/LOW 25: CPT

## 2021-04-06 PROCEDURE — 95720 EEG PHY/QHP EA INCR W/VEEG: CPT

## 2021-04-06 NOTE — PROGRESS NOTE ADULT - ASSESSMENT
Seizure disorder with breakthrough seizures secondary to non compliance with medication  -admited to veeg unit  -seizure precautions  -no AED's as per neuro  -routine labs reviewed  -neuro follow up pending  -ativan for seizure lasting over two min or two consecutive seizures without a return to baseline in between .    Progress Note Handoff  Pending Consults: neuro follow up  Pending Tests: veeg  Pending Results: veeg  Family Discussion: discussed continuing veeg with patient and neuro follow up- on agreement with plan of care   Disposition: Home___x__/SNF______/Other_____/Unknown at this time_____    Please call me with any questions at extension [phone]  spent 35 min on medical management

## 2021-04-06 NOTE — PROGRESS NOTE ADULT - SUBJECTIVE AND OBJECTIVE BOX
KESHAV MONROE  50y  Male      Patient is a 50y old  Male who presents for a VEEG (05 Apr 2021 12:23)      INTERVAL HPI/OVERNIGHT EVENTS:  Patient seen and examined earlier this morning  Lying comfortably in bed  denies having any seizures that he is aware of   continues on VEEG      REVIEW OF SYSTEMS:  CONSTITUTIONAL: No fever, weight loss, or fatigue  EYES: No eye pain, visual disturbances, or discharge  ENMT:  No difficulty hearing, tinnitus, vertigo; No sinus or throat pain  NECK: No pain or stiffness  RESPIRATORY: No cough, wheezing, chills or hemoptysis; No shortness of breath  CARDIOVASCULAR: No chest pain, palpitations, dizziness, or leg swelling  GASTROINTESTINAL: No abdominal or epigastric pain. No nausea, vomiting, or hematemesis; No diarrhea or constipation. No melena or hematochezia.  GENITOURINARY: No dysuria, frequency, hematuria, or incontinence  NEUROLOGICAL: No headaches, memory loss, loss of strength, numbness, or tremors  SKIN: No itching, burning, rashes, or lesions   LYMPH NODES: No enlarged glands  ENDOCRINE: No heat or cold intolerance; No hair loss  MUSCULOSKELETAL: No joint pain or swelling; No muscle, back, or extremity pain  PSYCHIATRIC: No depression, anxiety, mood swings, or difficulty sleeping  HEME/LYMPH: No easy bruising, or bleeding gums  ALLERY AND IMMUNOLOGIC: No hives or eczema    T(C): 36 (04-06-21 @ 05:56), Max: 36.3 (04-05-21 @ 13:15)  HR: 54 (04-06-21 @ 05:56) (52 - 56)  BP: 105/68 (04-06-21 @ 05:56) (105/68 - 110/67)  RR: 16 (04-06-21 @ 05:56) (16 - 16)  SpO2: --    PHYSICAL EXAM:  GENERAL: NAD, well-groomed, well-developed  HEAD:  Atraumatic, Normocephalic  EYES: EOMI, PERRLA, conjunctiva and sclera clear  ENMT: No tonsillar erythema, exudates, or enlargement; Moist mucous membranes, Good dentition, No lesions  NECK: Supple, No JVD, Normal thyroid  NERVOUS SYSTEM:  Alert & Oriented X3, Good concentration; Motor Strength 5/5 B/L upper and lower extremities; DTRs 2+ intact and symmetric  CHEST/LUNG: Clear to percussion bilaterally; No rales, rhonchi, wheezing, or rubs  HEART: Regular rate and rhythm; No murmurs, rubs, or gallops  ABDOMEN: Soft, Nontender, Nondistended; Bowel sounds present  EXTREMITIES:  2+ Peripheral Pulses, No clubbing, cyanosis, or edema  LYMPH: No lymphadenopathy noted  SKIN: No rashes or lesions    Consultant(s) Notes Reviewed:  [x ] YES  [ ] NO  Care Discussed with Consultants/Other Providers [ x] YES  [ ] NO    LAB:                        13.3   4.54  )-----------( 213      ( 06 Apr 2021 06:54 )             42.8     04-06    140  |  104  |  9<L>  ----------------------------<  90  4.7   |  29  |  1.1    Ca    9.4      06 Apr 2021 06:54  Phos  3.2     04-05  Mg     2.0     04-06    TPro  6.7  /  Alb  4.3  /  TBili  0.5  /  DBili  x   /  AST  12  /  ALT  12  /  AlkPhos  61  04-05    LIVER FUNCTIONS - ( 05 Apr 2021 15:12 )  Alb: 4.3 g/dL / Pro: 6.7 g/dL / ALK PHOS: 61 U/L / ALT: 12 U/L / AST: 12 U/L / GGT: x                 Drug Dosing Weight  Height (cm): 182.9 (05 Feb 2020 11:00)  Weight (kg): 101.4 (05 Feb 2020 11:00)  BMI (kg/m2): 30.3 (05 Feb 2020 11:00)  BSA (m2): 2.23 (05 Feb 2020 11:00)            RADIOLOGY & ADDITIONAL TESTS:  Imaging Personally Reviewed:  [x] YES  [ ] NO    HEALTH ISSUES - PROBLEM Dx:  Partial symptomatic epilepsy with simple partial seizures, not intractable, without status epilepticus  Partial symptomatic epilepsy with simple partial seizures, not intractable, without status epilepticus            MEDS:  chlorhexidine 4% Liquid 1 Application(s) Topical <User Schedule>  LORazepam   Injectable 2 milliGRAM(s) IV Push three times a day PRN

## 2021-04-07 LAB
COVID-19 SPIKE DOMAIN AB INTERP: NEGATIVE — SIGNIFICANT CHANGE UP
COVID-19 SPIKE DOMAIN ANTIBODY RESULT: 0.4 U/ML — SIGNIFICANT CHANGE UP
SARS-COV-2 IGG+IGM SERPL QL IA: 0.4 U/ML — SIGNIFICANT CHANGE UP
SARS-COV-2 IGG+IGM SERPL QL IA: NEGATIVE — SIGNIFICANT CHANGE UP

## 2021-04-07 PROCEDURE — 99232 SBSQ HOSP IP/OBS MODERATE 35: CPT

## 2021-04-07 PROCEDURE — 99231 SBSQ HOSP IP/OBS SF/LOW 25: CPT

## 2021-04-07 PROCEDURE — 95720 EEG PHY/QHP EA INCR W/VEEG: CPT

## 2021-04-07 NOTE — PROGRESS NOTE ADULT - SUBJECTIVE AND OBJECTIVE BOX
Epilepsy Attending Note:     KESHAV MONROE    50y Male  MRN [id number]    Vital Signs Last 24 Hrs  T(C): 36.5 (07 Apr 2021 05:10), Max: 36.5 (07 Apr 2021 05:10)  T(F): 97.7 (07 Apr 2021 05:10), Max: 97.7 (07 Apr 2021 05:10)  HR: 55 (07 Apr 2021 05:10) (55 - 64)  BP: 107/69 (07 Apr 2021 05:10) (101/55 - 115/69)  BP(mean): --  RR: 16 (07 Apr 2021 05:10) (16 - 16)  SpO2: --                          13.3   4.54  )-----------( 213      ( 06 Apr 2021 06:54 )             42.8       04-06    140  |  104  |  9<L>  ----------------------------<  90  4.7   |  29  |  1.1    Ca    9.4      06 Apr 2021 06:54  Phos  3.2     04-05  Mg     2.0     04-06    TPro  6.7  /  Alb  4.3  /  TBili  0.5  /  DBili  x   /  AST  12  /  ALT  12  /  AlkPhos  61  04-05      MEDICATIONS  (STANDING):  chlorhexidine 4% Liquid 1 Application(s) Topical <User Schedule>    MEDICATIONS  (PRN):  LORazepam   Injectable 2 milliGRAM(s) IV Push three times a day PRN generalized tonic-clonic seizure lasting longer than 2 minutes            VEEG in the last 24 hours:    Background----------  8-9 well organized    Focal and generalized slowing----------   left FT focal slowing    Interictal activity------------   large to frequent number of left FT sharps and spikes    Events------see below    Seizures------------   one seizure last night again in his sleep X 45 seconds. He did not report it and there was no clear clinical change in his behavior    Impression:  abnormal as above    Plan - seizure precaution            continue the monitoring

## 2021-04-07 NOTE — PROGRESS NOTE ADULT - SUBJECTIVE AND OBJECTIVE BOX
Subjective:    keshav reports doing fine today. Says that he stopped taking his medications bc he started taking CBD oil and felt like it was helping him. Did have seizure last night on EEG.      ROS: 10 system review negative other than mentioned in subjective      T(C): 36.5 (04-07-21 @ 05:10), Max: 36.5 (04-07-21 @ 05:10)  HR: 55 (04-07-21 @ 05:10) (55 - 64)  BP: 107/69 (04-07-21 @ 05:10) (101/55 - 115/69)  RR: 16 (04-07-21 @ 05:10) (16 - 16)  SpO2: --    Physical Exam:  General: appears stated age, no acute distress, speech clear  Eyes: PERRLA/ EOMI  HEENT: Neck supple, trachea midline, No JVD  Respiratory: Clear to auscultation bilaterally, No wheezing/rales/rhonchi  CV: RRR,  no murmurs/ rubs / gallops  Abdomen: Soft, Nontender, nondistended, bowel sounds present  Extremities: No edema, + peripheral pulses  Skin: No Rashes  Neurology: A&Ox3, nonfocal    --- LABS ---                         13.3   4.54  )-----------( 213      ( 06 Apr 2021 06:54 )             42.8     04-06    140  |  104  |  9<L>  ----------------------------<  90  4.7   |  29  |  1.1    Ca    9.4      06 Apr 2021 06:54  Phos  3.2     04-05  Mg     2.0     04-06    TPro  6.7  /  Alb  4.3  /  TBili  0.5  /  DBili  x   /  AST  12  /  ALT  12  /  AlkPhos  61  04-05    PT/INR - ( 05 Apr 2021 15:12 )   PT: 13.00 sec;   INR: 1.13 ratio         PTT - ( 05 Apr 2021 15:12 )  PTT:29.9 sec        --- MEDICATIONS ---   chlorhexidine 4% Liquid 1 Application(s) Topical <User Schedule>  LORazepam   Injectable 2 milliGRAM(s) IV Push three times a day PRN      ASSESSMENT AND PLAN    KESHAV MONROE is a 50y Male with hx of seizure since childhood with refractory epilepsy despite trial on multiple AEDs. Was dx with Mesial temporal sclerosis and is s/p left anterior temporal lobectomy 2001. Admitted for VEEG.     # Breakthrough Seizures -- Was on Aptiom 1500 QD but patient stopped taking in 2/2021 on his own as he was trying CBD oil and felt like it was helping. Has had seizure like activity since then while sleeping but unable to report quantity or length. Wakes up having wet the bed. Follows with Dr. Saenz. Has tried Dilantin/lamictal/keppral/trileptal/Amtiom in past.   ·	Neurology managing. VEEG continued. Had seizure last night 4/6. Will continue to monitor today and determine medication regimen tomorrow  ·	Holding home Aptiom at this time  ·	Seizure precautions      DVT -- SCD  Code -- Full  Dispo -- Likely discharge tomorrow, pending Neurology clearance and plan

## 2021-04-08 ENCOUNTER — TRANSCRIPTION ENCOUNTER (OUTPATIENT)
Age: 51
End: 2021-04-08

## 2021-04-08 VITALS
TEMPERATURE: 97 F | HEART RATE: 60 BPM | SYSTOLIC BLOOD PRESSURE: 103 MMHG | RESPIRATION RATE: 16 BRPM | DIASTOLIC BLOOD PRESSURE: 57 MMHG

## 2021-04-08 PROCEDURE — 99232 SBSQ HOSP IP/OBS MODERATE 35: CPT

## 2021-04-08 PROCEDURE — 99238 HOSP IP/OBS DSCHRG MGMT 30/<: CPT

## 2021-04-08 PROCEDURE — 95720 EEG PHY/QHP EA INCR W/VEEG: CPT

## 2021-04-08 RX ORDER — ESLICARBAZEPINE ACETATE 800 MG/1
300 TABLET ORAL ONCE
Refills: 0 | Status: COMPLETED | OUTPATIENT
Start: 2021-04-08 | End: 2021-04-08

## 2021-04-08 RX ORDER — ESLICARBAZEPINE ACETATE 800 MG/1
3 TABLET ORAL
Qty: 0 | Refills: 3 | DISCHARGE
Start: 2021-04-08 | End: 2022-04-02

## 2021-04-08 RX ORDER — ESLICARBAZEPINE ACETATE 800 MG/1
1 TABLET ORAL
Qty: 225 | Refills: 3
Start: 2021-04-08 | End: 2022-04-02

## 2021-04-08 RX ADMIN — ESLICARBAZEPINE ACETATE 300 MILLIGRAM(S): 800 TABLET ORAL at 08:46

## 2021-04-08 NOTE — DISCHARGE NOTE PROVIDER - NSDCCPCAREPLAN_GEN_ALL_CORE_FT
PRINCIPAL DISCHARGE DIAGNOSIS  Diagnosis: Seizure disorder  Assessment and Plan of Treatment: Continue Apitom. Follow up with Dr Saenz. Obtain blood work prior to seeing Dr Saenz. Seizure precautions. Call doctor if you have a seizure or return to hospital for any prolonged (greater then 5 minutes) or recurrent seizures.       PRINCIPAL DISCHARGE DIAGNOSIS  Diagnosis: Seizure disorder  Assessment and Plan of Treatment: Continue Apitom. Follow up with Dr Saenz. Obtain blood work prior to seeing Dr Saenz. Seizure precautions. Call doctor if you have a seizure or return to hospital for any prolonged (greater then 5 minutes) or recurrent seizures.  Have follow up with Dr. Saenz on 6/23/2021 at 1:30PM

## 2021-04-08 NOTE — PROGRESS NOTE ADULT - SUBJECTIVE AND OBJECTIVE BOX
Epilepsy Attending Note:     KESHAV MONROE    50y Male  MRN [id number]    Vital Signs Last 24 Hrs  T(C): 36.3 (08 Apr 2021 04:54), Max: 36.7 (07 Apr 2021 14:14)  T(F): 97.4 (08 Apr 2021 04:54), Max: 98 (07 Apr 2021 14:14)  HR: 60 (08 Apr 2021 04:54) (60 - 66)  BP: 103/57 (08 Apr 2021 04:54) (103/57 - 117/67)  BP(mean): --  RR: 16 (08 Apr 2021 04:54) (16 - 16)  SpO2: --                MEDICATIONS  (STANDING):  chlorhexidine 4% Liquid 1 Application(s) Topical <User Schedule>    MEDICATIONS  (PRN):  LORazepam   Injectable 2 milliGRAM(s) IV Push three times a day PRN generalized tonic-clonic seizure lasting longer than 2 minutes            VEEG in the last 24 hours:    Background-----------  8-9 hz, reactive and organized. left breach artifact    Focal and generalized slowing------   left FT focal slowing    Interictal activity------------  large number of left FT spikes and sharps    Events------------------- see below    Seizures------------   last night she did have two brief left FT seizures from his sleep. that he did not report    Impression:  abnormal as above    Plan - we had a long session to go over the results and plan. Hopefully as he agreed today he would continue with his AED           will give 300 mg of Aptiom today am and 600 tonight and go bachk to his baseline dose from tomorrow          asked him to take electrolyte fluid such as gaterate. F/U and level

## 2021-04-08 NOTE — DISCHARGE NOTE PROVIDER - CARE PROVIDER_API CALL
Roberto Saenz)  Neurology  [address] [place] [address], [address] [place] [address]  Phone: [phone]  Fax: [phone]  Scheduled Appointment: 06/23/2021 01:30 PM

## 2021-04-08 NOTE — DISCHARGE NOTE NURSING/CASE MANAGEMENT/SOCIAL WORK - PATIENT PORTAL LINK FT
You can access the FollowMyHealth Patient Portal offered by [facility] by registering at the following website: [email or web address]. By joining [facility]’s FollowMyHealth portal, you will also be able to view your health information using other applications (apps) compatible with our system.

## 2021-04-08 NOTE — DISCHARGE NOTE PROVIDER - HOSPITAL COURSE
KESHAV MONROE is a 50y Male with hx of seizure since childhood with refractory epilepsy despite trial on multiple AEDs. Was dx with Mesial temporal sclerosis and is s/p left anterior temporal lobectomy 2001. Admitted for VEEG.     # Breakthrough Seizures -- Was on Aptiom 1500 QD but patient stopped taking in 2/2021 on his own as he was trying CBD oil and felt like it was helping. Has had seizure like activity since then while sleeping but unable to report quantity or length. Wakes up having wet the bed. Follows with Dr. Saenz. Has tried Dilantin/lamictal/keppral/trileptal/Amtiom in past.   Neurology managing.  Had seizure last night 4/6.  VEEG now completed today. VEEG was abnormal.  Being dc home to resume Aptiom.  follow up with neurology as outpatient   KESHAV MONROE is a 50y Male with hx of seizure since childhood with refractory epilepsy despite trial on multiple AEDs. Was dx with Mesial temporal sclerosis and is s/p left anterior temporal lobectomy 2001. Admitted for VEEG.     # Breakthrough Seizures -- Was on Aptiom 1500 QD but patient stopped taking in 2/2021 on his own as he was trying CBD oil and felt like it was helping. Has had seizure like activity since then while sleeping but unable to report quantity or length. Wakes up having wet the bed. Follows with Dr. Saenz. Has tried Dilantin/lamictal/keppral/trileptal/Amtiom in past. Neurology managing.  Had seizure night of 4/6 and 4/7.  VEEG now completed today. VEEG was abnormal. He was restarted on Aptiom and will have follow up with Neurology on 6/23/2021 at 1:30PM

## 2021-04-13 NOTE — EEG REPORT - NS EEG TEXT BOX
Epilepsy Attending Note:     KESHAV MONROE    50y Male  MRN [id number]    Vital Signs Last 24 Hrs  T(C): 36 (06 Apr 2021 05:56), Max: 36.3 (05 Apr 2021 13:15)  T(F): 96.8 (06 Apr 2021 05:56), Max: 97.4 (05 Apr 2021 13:15)  HR: 54 (06 Apr 2021 05:56) (52 - 56)  BP: 105/68 (06 Apr 2021 05:56) (105/68 - 110/67)  BP(mean): --  RR: 16 (06 Apr 2021 05:56) (16 - 16)  SpO2: --                          13.3   4.54  )-----------( 213      ( 06 Apr 2021 06:54 )             42.8       04-06    140  |  104  |  9<L>  ----------------------------<  90  4.7   |  29  |  1.1    Ca    9.4      06 Apr 2021 06:54  Phos  3.2     04-05  Mg     2.0     04-06    TPro  6.7  /  Alb  4.3  /  TBili  0.5  /  DBili  x   /  AST  12  /  ALT  12  /  AlkPhos  61  04-05      MEDICATIONS  (STANDING):  chlorhexidine 4% Liquid 1 Application(s) Topical <User Schedule>    MEDICATIONS  (PRN):  LORazepam   Injectable 2 milliGRAM(s) IV Push three times a day PRN generalized tonic-clonic seizure lasting longer than 2 minutes            VEEG in the last 24 hours:    Background------------ 8-9 hz . well organized    Focal and generalized slowing------   left FT focal slowing    Interictal activity-------------  small number of left FT/ anterior  temporal sharps and spikes    Events---------- see below    Seizures--- around 12 am he did have a left hemispheric partial seizure that lasted for about 1 min.  It was not reported by him. clinically he was seen waking up from sleep and having behavioral arrest ( his back was to the camera)    Impression:  Left partial epilepsy    Plan - will continue the monitoring           seizure precaution           should not sleep on his face    
Stable

## 2021-04-14 DIAGNOSIS — Z98.890 OTHER SPECIFIED POSTPROCEDURAL STATES: ICD-10-CM

## 2021-04-14 DIAGNOSIS — G40.909 EPILEPSY, UNSPECIFIED, NOT INTRACTABLE, WITHOUT STATUS EPILEPTICUS: ICD-10-CM

## 2021-04-14 DIAGNOSIS — R32 UNSPECIFIED URINARY INCONTINENCE: ICD-10-CM

## 2021-04-14 DIAGNOSIS — F39 UNSPECIFIED MOOD [AFFECTIVE] DISORDER: ICD-10-CM

## 2021-04-14 DIAGNOSIS — Z91.14 PATIENT'S OTHER NONCOMPLIANCE WITH MEDICATION REGIMEN: ICD-10-CM

## 2021-04-14 DIAGNOSIS — G40.802 OTHER EPILEPSY, NOT INTRACTABLE, WITHOUT STATUS EPILEPTICUS: ICD-10-CM

## 2021-06-24 ENCOUNTER — APPOINTMENT (OUTPATIENT)
Dept: NEUROLOGY | Facility: CLINIC | Age: 51
End: 2021-06-24
Payer: COMMERCIAL

## 2021-06-24 VITALS
HEIGHT: 73 IN | TEMPERATURE: 98 F | WEIGHT: 227 LBS | HEART RATE: 84 BPM | DIASTOLIC BLOOD PRESSURE: 82 MMHG | BODY MASS INDEX: 30.09 KG/M2 | OXYGEN SATURATION: 97 % | SYSTOLIC BLOOD PRESSURE: 124 MMHG

## 2021-06-24 PROCEDURE — 99214 OFFICE O/P EST MOD 30 MIN: CPT

## 2021-06-24 PROCEDURE — 99072 ADDL SUPL MATRL&STAF TM PHE: CPT

## 2021-06-25 NOTE — HISTORY OF PRESENT ILLNESS
[FreeTextEntry1] : Tate is here for the F/U.\par He was admitted to the EMU in April while was not taking his AED.\par During that  he did have 4 brief partial seizures from the left side . all in his sleep and he was also not aware of them.\par Showing him the seizures he was convinced enough to go back on the Aptiom\par He is currently on 2 1/2 of 600 mg tabs.\par He says he has had no events until the night before last.  he woke up finding out that he wet his bed and his body was also sore\par He says there was nothing unusual except eating late  and perhaps  not sleeping well ??\par Otherwise he continues to go to work . has two jobs. planning to travel to [place] for his brother/s wedding\par He says like always having problem with organizing his personal affairs and procrastinating.\par He is much better at his job\par No Ha\par No change in the memory and mood ( baseline)\par trying to also use electrolyte fluids\par He did do a blood level the results are pending

## 2021-06-25 NOTE — PHYSICAL EXAM
[FreeTextEntry1] : A/A. OX3\par follows 4 step commands\par mild psychomotor slowing\par crosses the midline well\par no drift\par No dysmetria\par stable gait

## 2021-06-25 NOTE — ASSESSMENT
[FreeTextEntry1] : S/P left temporal lobectomy for refractory seizures\par None compliant with the AED and having breakthrough seizures\par \par \par Plan\par \par check the level\par adjust the dose\par discussed sleep and organization

## 2021-09-10 ENCOUNTER — APPOINTMENT (OUTPATIENT)
Dept: NEUROLOGY | Facility: CLINIC | Age: 51
End: 2021-09-10

## 2021-09-16 RX ORDER — LORAZEPAM 1 MG/1
1 TABLET ORAL
Qty: 4 | Refills: 0 | Status: ACTIVE | COMMUNITY
Start: 2021-09-16 | End: 1900-01-01

## 2021-09-23 ENCOUNTER — APPOINTMENT (OUTPATIENT)
Dept: NEUROLOGY | Facility: CLINIC | Age: 51
End: 2021-09-23
Payer: COMMERCIAL

## 2021-09-23 VITALS
BODY MASS INDEX: 29.95 KG/M2 | DIASTOLIC BLOOD PRESSURE: 70 MMHG | HEIGHT: 73 IN | WEIGHT: 226 LBS | OXYGEN SATURATION: 98 % | SYSTOLIC BLOOD PRESSURE: 122 MMHG | HEART RATE: 70 BPM | TEMPERATURE: 97.3 F

## 2021-09-23 PROCEDURE — 99214 OFFICE O/P EST MOD 30 MIN: CPT

## 2021-09-23 NOTE — DISCHARGE NOTE NURSING/CASE MANAGEMENT/SOCIAL WORK - NSDPDISTO_GEN_ALL_CORE
Home Glycopyrrolate Counseling:  I discussed with the patient the risks of glycopyrrolate including but not limited to skin rash, drowsiness, dry mouth, difficulty urinating, and blurred vision.

## 2021-10-01 NOTE — HISTORY OF PRESENT ILLNESS
[FreeTextEntry1] : Tate is here for the F/U.\par He is not reporting any specific events however rarely waking up with his bed wet and also occasionally with a sense of body ache. Considering his history and the monitoring that we had , these could be signs of nocturnal seizure.\par He is at his baseline otherwise. complaining of his motivation and also his memory.\par He says he is good at his job. However is not as efficient as others. 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 test is pending.\par His last Aptiom level is 19.7\par

## 2021-10-01 NOTE — ASSESSMENT
[FreeTextEntry1] : S/P left anterior temporal lobectomy for refractory seizure\par continues to have breakthrogh events\par \par \par Plan\par neuropsych. test\par Seizure diary\par discussed the sleep pattern and daily organization of his time\par consider WADA and surgical conference

## 2021-10-01 NOTE — PHYSICAL EXAM
yes [FreeTextEntry1] : A/A/Ox3\par follows 4 step command\par good mood\par slight difficulty with word finding \par is aware of current events with slight difficulty with names\par CN- normal\par Motor- normal\par stable gait\par no dysmetria

## 2021-10-07 ENCOUNTER — APPOINTMENT (OUTPATIENT)
Dept: NEUROPSYCHOLOGY | Facility: CLINIC | Age: 51
End: 2021-10-07

## 2021-10-19 ENCOUNTER — INPATIENT (INPATIENT)
Facility: HOSPITAL | Age: 51
LOS: 2 days | Discharge: HOME | End: 2021-10-22
Attending: FAMILY MEDICINE | Admitting: FAMILY MEDICINE
Payer: COMMERCIAL

## 2021-10-19 VITALS
DIASTOLIC BLOOD PRESSURE: 85 MMHG | RESPIRATION RATE: 20 BRPM | OXYGEN SATURATION: 100 % | TEMPERATURE: 97 F | HEART RATE: 82 BPM | SYSTOLIC BLOOD PRESSURE: 133 MMHG | WEIGHT: 220.02 LBS | HEIGHT: 72 IN

## 2021-10-19 DIAGNOSIS — Z82.0 FAMILY HISTORY OF EPILEPSY AND OTHER DISEASES OF THE NERVOUS SYSTEM: ICD-10-CM

## 2021-10-19 DIAGNOSIS — G40.119 LOCALIZATION-RELATED (FOCAL) (PARTIAL) SYMPTOMATIC EPILEPSY AND EPILEPTIC SYNDROMES WITH SIMPLE PARTIAL SEIZURES, INTRACTABLE, WITHOUT STATUS EPILEPTICUS: ICD-10-CM

## 2021-10-19 DIAGNOSIS — D33.2 BENIGN NEOPLASM OF BRAIN, UNSPECIFIED: Chronic | ICD-10-CM

## 2021-10-19 DIAGNOSIS — Z90.89 ACQUIRED ABSENCE OF OTHER ORGANS: ICD-10-CM

## 2021-10-19 DIAGNOSIS — Z03.89 ENCOUNTER FOR OBSERVATION FOR OTHER SUSPECTED DISEASES AND CONDITIONS RULED OUT: ICD-10-CM

## 2021-10-19 DIAGNOSIS — Z20.822 CONTACT WITH AND (SUSPECTED) EXPOSURE TO COVID-19: ICD-10-CM

## 2021-10-19 DIAGNOSIS — Z86.011 PERSONAL HISTORY OF BENIGN NEOPLASM OF THE BRAIN: ICD-10-CM

## 2021-10-19 LAB
ALBUMIN SERPL ELPH-MCNC: 4.9 G/DL — SIGNIFICANT CHANGE UP (ref 3.5–5.2)
ALP SERPL-CCNC: 80 U/L — SIGNIFICANT CHANGE UP (ref 30–115)
ALT FLD-CCNC: 33 U/L — SIGNIFICANT CHANGE UP (ref 0–41)
ANION GAP SERPL CALC-SCNC: 10 MMOL/L — SIGNIFICANT CHANGE UP (ref 7–14)
AST SERPL-CCNC: 71 U/L — HIGH (ref 0–41)
BASOPHILS # BLD AUTO: 0.01 K/UL — SIGNIFICANT CHANGE UP (ref 0–0.2)
BASOPHILS NFR BLD AUTO: 0.2 % — SIGNIFICANT CHANGE UP (ref 0–1)
BILIRUB SERPL-MCNC: 0.4 MG/DL — SIGNIFICANT CHANGE UP (ref 0.2–1.2)
BUN SERPL-MCNC: 12 MG/DL — SIGNIFICANT CHANGE UP (ref 10–20)
CALCIUM SERPL-MCNC: 10.2 MG/DL — HIGH (ref 8.5–10.1)
CHLORIDE SERPL-SCNC: 103 MMOL/L — SIGNIFICANT CHANGE UP (ref 98–110)
CK SERPL-CCNC: 2341 U/L — HIGH (ref 0–225)
CK SERPL-CCNC: 3146 U/L — HIGH (ref 0–225)
CO2 SERPL-SCNC: 27 MMOL/L — SIGNIFICANT CHANGE UP (ref 17–32)
CREAT SERPL-MCNC: 1.4 MG/DL — SIGNIFICANT CHANGE UP (ref 0.7–1.5)
EOSINOPHIL # BLD AUTO: 0.11 K/UL — SIGNIFICANT CHANGE UP (ref 0–0.7)
EOSINOPHIL NFR BLD AUTO: 2.3 % — SIGNIFICANT CHANGE UP (ref 0–8)
GLUCOSE SERPL-MCNC: 94 MG/DL — SIGNIFICANT CHANGE UP (ref 70–99)
HCT VFR BLD CALC: 42.3 % — SIGNIFICANT CHANGE UP (ref 42–52)
HGB BLD-MCNC: 13.6 G/DL — LOW (ref 14–18)
IMM GRANULOCYTES NFR BLD AUTO: 0.2 % — SIGNIFICANT CHANGE UP (ref 0.1–0.3)
LYMPHOCYTES # BLD AUTO: 1.91 K/UL — SIGNIFICANT CHANGE UP (ref 1.2–3.4)
LYMPHOCYTES # BLD AUTO: 39.5 % — SIGNIFICANT CHANGE UP (ref 20.5–51.1)
MAGNESIUM SERPL-MCNC: 1.9 MG/DL — SIGNIFICANT CHANGE UP (ref 1.8–2.4)
MCHC RBC-ENTMCNC: 28.2 PG — SIGNIFICANT CHANGE UP (ref 27–31)
MCHC RBC-ENTMCNC: 32.2 G/DL — SIGNIFICANT CHANGE UP (ref 32–37)
MCV RBC AUTO: 87.6 FL — SIGNIFICANT CHANGE UP (ref 80–94)
MONOCYTES # BLD AUTO: 0.43 K/UL — SIGNIFICANT CHANGE UP (ref 0.1–0.6)
MONOCYTES NFR BLD AUTO: 8.9 % — SIGNIFICANT CHANGE UP (ref 1.7–9.3)
NEUTROPHILS # BLD AUTO: 2.36 K/UL — SIGNIFICANT CHANGE UP (ref 1.4–6.5)
NEUTROPHILS NFR BLD AUTO: 48.9 % — SIGNIFICANT CHANGE UP (ref 42.2–75.2)
NRBC # BLD: 0 /100 WBCS — SIGNIFICANT CHANGE UP (ref 0–0)
PLATELET # BLD AUTO: 241 K/UL — SIGNIFICANT CHANGE UP (ref 130–400)
POTASSIUM SERPL-MCNC: 5.1 MMOL/L — HIGH (ref 3.5–5)
POTASSIUM SERPL-SCNC: 5.1 MMOL/L — HIGH (ref 3.5–5)
PROT SERPL-MCNC: 7.4 G/DL — SIGNIFICANT CHANGE UP (ref 6–8)
RBC # BLD: 4.83 M/UL — SIGNIFICANT CHANGE UP (ref 4.7–6.1)
RBC # FLD: 11.7 % — SIGNIFICANT CHANGE UP (ref 11.5–14.5)
SARS-COV-2 RNA SPEC QL NAA+PROBE: SIGNIFICANT CHANGE UP
SODIUM SERPL-SCNC: 140 MMOL/L — SIGNIFICANT CHANGE UP (ref 135–146)
WBC # BLD: 4.83 K/UL — SIGNIFICANT CHANGE UP (ref 4.8–10.8)
WBC # FLD AUTO: 4.83 K/UL — SIGNIFICANT CHANGE UP (ref 4.8–10.8)

## 2021-10-19 PROCEDURE — 93010 ELECTROCARDIOGRAM REPORT: CPT

## 2021-10-19 PROCEDURE — 99222 1ST HOSP IP/OBS MODERATE 55: CPT

## 2021-10-19 PROCEDURE — 99285 EMERGENCY DEPT VISIT HI MDM: CPT

## 2021-10-19 RX ORDER — LANOLIN ALCOHOL/MO/W.PET/CERES
3 CREAM (GRAM) TOPICAL AT BEDTIME
Refills: 0 | Status: DISCONTINUED | OUTPATIENT
Start: 2021-10-19 | End: 2021-10-22

## 2021-10-19 RX ORDER — CHLORHEXIDINE GLUCONATE 213 G/1000ML
1 SOLUTION TOPICAL
Refills: 0 | Status: DISCONTINUED | OUTPATIENT
Start: 2021-10-19 | End: 2021-10-22

## 2021-10-19 RX ORDER — ESLICARBAZEPINE ACETATE 800 MG/1
1800 TABLET ORAL AT BEDTIME
Refills: 0 | Status: DISCONTINUED | OUTPATIENT
Start: 2021-10-19 | End: 2021-10-22

## 2021-10-19 RX ORDER — ACETAMINOPHEN 500 MG
650 TABLET ORAL EVERY 6 HOURS
Refills: 0 | Status: DISCONTINUED | OUTPATIENT
Start: 2021-10-19 | End: 2021-10-22

## 2021-10-19 RX ORDER — ENOXAPARIN SODIUM 100 MG/ML
40 INJECTION SUBCUTANEOUS AT BEDTIME
Refills: 0 | Status: DISCONTINUED | OUTPATIENT
Start: 2021-10-19 | End: 2021-10-22

## 2021-10-19 RX ORDER — ESLICARBAZEPINE ACETATE 800 MG/1
2.5 TABLET ORAL
Qty: 0 | Refills: 0 | DISCHARGE

## 2021-10-19 RX ORDER — ONDANSETRON 8 MG/1
4 TABLET, FILM COATED ORAL EVERY 8 HOURS
Refills: 0 | Status: DISCONTINUED | OUTPATIENT
Start: 2021-10-19 | End: 2021-10-22

## 2021-10-19 RX ADMIN — ESLICARBAZEPINE ACETATE 1800 MILLIGRAM(S): 800 TABLET ORAL at 22:00

## 2021-10-19 NOTE — ED ADULT NURSE NOTE - NSIMPLEMENTINTERV_GEN_ALL_ED
Implemented All Fall with Harm Risk Interventions:  Camp to call system. Call bell, personal items and telephone within reach. Instruct patient to call for assistance. Room bathroom lighting operational. Non-slip footwear when patient is off stretcher. Physically safe environment: no spills, clutter or unnecessary equipment. Stretcher in lowest position, wheels locked, appropriate side rails in place. Provide visual cue, wrist band, yellow gown, etc. Monitor gait and stability. Monitor for mental status changes and reorient to person, place, and time. Review medications for side effects contributing to fall risk. Reinforce activity limits and safety measures with patient and family. Provide visual clues: red socks.

## 2021-10-19 NOTE — ED PROVIDER NOTE - CARE PROVIDER_API CALL
Roberto Saenz)  Neurology  [address] [place] [address], [place] [address]  Phone: [phone]  Fax: [phone]  Follow Up Time: 1-3 Days

## 2021-10-19 NOTE — H&P ADULT - HISTORY OF PRESENT ILLNESS
50 yr old male with hx of seizure since childhood with refractory epilepsy despite trial on multiple AEDs, Mesial temporal sclerosis and is s/p left anterior temporal lobectomy 2001 presented to ER for ................................. 50 yr old male with hx of seizure since childhood with refractory epilepsy despite trial on multiple AEDs, Mesial temporal sclerosis and is s/p left anterior temporal lobectomy 2001 presented to ER for COVID swab for elective V-EEG admission.  Patient reports that on 10/11 he received his COVID vaccination.  He reports that since after he has been experiencing a worsening of his seizures, experiencing breakthrough seizures.  He reports he discussed this with his Neurologist and was advised to come in for V-EEG admission.  No complaints of CP/SOB.  No abdominal or urinary complaints.

## 2021-10-19 NOTE — H&P ADULT - NSHPLABSRESULTS_GEN_ALL_CORE
13.6   4.83  )-----------( 241      ( 19 Oct 2021 09:00 )             42.3       140  |  103  |  12  ----------------------------<  94  5.1<H>   |  27  |  1.4    Ca    10.2<H>      19 Oct 2021 09:00  Mg     1.9     10-19    TPro  7.4  /  Alb  4.9  /  TBili  0.4  /  DBili  x   /  AST  71<H>  /  ALT  33  /  AlkPhos  80  10-19

## 2021-10-19 NOTE — PATIENT PROFILE ADULT - NSPROEXTENSIONSOFSELF_GEN_A_NUR
[facility] ATHLETIC [facility] PHYSICAL THERAPY  [address]  [facility] [address]  [phone]    2019    Re: Odalis Vee   :   1978  MRN:  [id number]   REFERRING PHYSICIAN:   Gus Caba    [facility] ATHLETIC [facility] PHYSICAL [facility]  Date of Initial Evaluation:  19  Visits:  Rxs Used: 6  Reason for Referral:  Pain in joint of right shoulder      DISCHARGE REPORT  Progress reporting period is from IE to 19.       SUBJECTIVE  Subjective changes noted by patient:  Subjective: Feels stronger throwing (softball) and sleeping better but still jabbing pain episodes noted certain over head movements (volleyball spike)    Current pain level is  Current Pain level: (0-2/10).  Previous pain level was   Initial Pain level: 2/10.   Changes in function:  Yes (See Goal flowsheet attached for changes in current functional level).  Adverse reaction to treatment or activity: None.    OBJECTIVE  Changes noted in objective findings:  Objective: MMT:  R shoulder IR/ER in .  Good scap stab with high level dynmaic testing/training.  crepitis still noted.     ASSESSMENT/PLAN  Updated problem list and treatment plan: Diagnosis 1: R shoulder pain Pain -  home program.  Decreased ROM/flexibility - home program.  Decreased strength - home program.  Impaired muscle performance - home program.  Decreased function - home program.  Impaired posture - home program.  STG/LTGs have been met or progress has been made towards goals:  Yes (See Goal flow sheet completed today.)  Assessment of Progress: The patient's condition is improving.  Self Management Plans:  Patient is independent in a home treatment program.  Patient is independent in self management of symptoms.  I have re-evaluated this patient and find that the nature, scope, duration and intensity of the therapy is appropriate for the medical condition of the patient.  Odalis continues to  require the following intervention to meet STG and LTG's:  PT intervention is no longer required to meet STG/LTG.    Recommendations:  This patient is ready to be discharged from therapy and continue their home treatment program.    Thank you for your referral.    INQUIRIES  Therapist: Valentino Marlow, [facility]   [facility] [place] PH[facility]ICAL [facility]ERAP[facility]  [address] [address] [address]  Phone: [phone]  Fax: [phone]      eyeglasses

## 2021-10-19 NOTE — ED PROVIDER NOTE - CLINICAL SUMMARY MEDICAL DECISION MAKING FREE TEXT BOX
49 y/o M PMHx Seizure disorder on eslicarbazepine presenting s/p nightly seizures. Patient complicant with medications. Patient spoke with Dr. Agrawal, who wants to perform a STAT EEG for him. No fever or signs of infectious etiology.  No injury. Patient denies headache, blurry vision, N/V, abdominal pain.  On exam, VS reviewed. Patient has a non focal neuro exam. IV placed, labs sent,. COVID done.  ED work up reviewed and results and plan of care discussed with patient. Patient requires admission for further work up, monitoring, and management. Need for admission discussed with patient.

## 2021-10-19 NOTE — CONSULT NOTE ADULT - SUBJECTIVE AND OBJECTIVE BOX
Neurology/Epilepsy Consult:    KESHAV MONROE 50y Male  MRN-[id number]    Patient is a 50y old  Male who presents with a chief complaint of Breakthrough Seizure (19 Oct 2021 11:22)        HPI:  50 yr old male with hx of seizure since childhood with refractory epilepsy despite trial on multiple AEDs, Mesial temporal sclerosis and is s/p left anterior temporal lobectomy  presented to ER for COVID swab for elective V-EEG admission.  Patient reports that on 10/11 he received his COVID vaccination.  He reports that since after he has been experiencing a worsening of his seizures, experiencing breakthrough seizures.  He reports he discussed this with his Neurologist and was advised to come in for V-EEG admission.  No complaints of CP/SOB.  No abdominal or urinary complaints.   (19 Oct 2021 11:22)        PAST MEDICAL & SURGICAL HISTORY:  Partial symptomatic epilepsy with simple partial seizures, not intractable, without status epilepticus    Benign neoplasm of brain, unspecified brain region  Surgical Rx          FAMILY HISTORY:        Social History:          Risk factors:  Born full-term, , no complications  Normal growth and development  No febrile seizures/CNS infections  No head trauma with loss of consciousness      Allergy:  No Known Allergies        Home Medications:  Aptiom 600 mg oral tablet: 3 tab(s) orally once a day (at bedtime) (19 Oct 2021 13:16)        MEDICATIONS  (STANDING):  chlorhexidine 4% Liquid 1 Application(s) Topical <User Schedule>  enoxaparin Injectable 40 milliGRAM(s) SubCutaneous at bedtime  eslicarbazepine 1800 milliGRAM(s) Oral at bedtime    MEDICATIONS  (PRN):  acetaminophen   Tablet .. 650 milliGRAM(s) Oral every 6 hours PRN Temp greater or equal to 38C (100.4F), Mild Pain (1 - 3)  LORazepam   Injectable 2 milliGRAM(s) IV Push three times a day PRN generalized tonic-clonic seizure lasting longer than 2 minutes  melatonin 3 milliGRAM(s) Oral at bedtime PRN Insomnia  ondansetron Injectable 4 milliGRAM(s) IV Push every 8 hours PRN Nausea and/or Vomiting        Review of systems:    Constitutional: No fever, weight loss or fatigue    Eyes: No eye pain or discharge  ENMT:  No difficulty hearing; No sinus or throat pain  Neck: No pain or stiffness  Respiratory: No cough, wheezing, chills or hemoptysis  Cardiovascular: No chest pain, palpitations, shortness of breath, dyspnea on exertion  Gastrointestinal: No abdominal pain, nausea, vomiting or hematemesis; No diarrhea or constipation.   Genitourinary: No dysuria, frequency, hematuria or incontinence  Neurological: As per HPI  Skin: No rashes or lesions   Endocrine: No heat or cold intolerance; No hair loss  Musculoskeletal: No joint pain or swelling  Psychiatric: No depression, anxiety, mood swings  Heme/Lymph: No easy bruising or bleeding gums        T(F): 96.3 (10-19-21 @ 09:23), Max: 97.3 (10-19-21 @ 07:28)  HR: 51 (10-19-21 @ 09:23) (51 - 82)  BP: 115/73 (10-19-21 @ 09:23) (115/73 - 133/85)  RR: 18 (10-19-21 @ 09:23) (18 - 20)  SpO2: 99% (10-19-21 @ 09:23) (99% - 100%)        Neurologic Examination:  General:  Appearance is consistent with chronologic age.  No abnormal facies.   General: The patient is oriented to person, place, time and date.  Recent and remote memory intact.  Follows 4-step directions. Fund of knowledge is intact and normal.  Language with normal repetition, comprehension and naming.  Nondysarthric.    Cranial nerves: EOMI w/o nystagmus, skew or reported double vision.  PERRL.  No ptosis/weakness of eyelid closure.  Facial sensation is normal with normal bite.  No facial asymmetry.  Hearing grossly intact b/l.  Palate elevates midline.  Tongue midline.  Motor examination:   Normal tone, bulk and range of motion.  No tenderness, twitching, tremors or involuntary movements.  Formal Muscle Strength Testin/5 UE; 5/5 LE.  No observable drift.  Reflexes:   2+ b/l pectoralis, biceps, triceps, brachioradialis, patella and Achilles.  Plantar response downgoing b/l.  Jaw jerk, Keith, clonus absent.  Sensory examination:   Intact to light touch and pinprick, pain, temperature and proprioception and vibration in all extremities.  Cerebellum:   FTN/HKS intact with normal ALEXIA in all limbs.  No dysmetria or dysdiadokinesia.  Gait narrow based and normal.        Labs:                         13.6   4.83  )-----------( 241      ( 19 Oct 2021 09:00 )             42.3     10-19    140  |  103  |  12  ----------------------------<  94  5.1<H>   |  27  |  1.4    Ca    10.2<H>      19 Oct 2021 09:00  Mg     1.9     10-19    TPro  7.4  /  Alb  4.9  /  TBili  0.4  /  DBili  x   /  AST  71<H>  /  ALT  33  /  AlkPhos  80  10-19    LIVER FUNCTIONS - ( 19 Oct 2021 09:00 )  Alb: 4.9 g/dL / Pro: 7.4 g/dL / ALK PHOS: 80 U/L / ALT: 33 U/L / AST: 71 U/L / GGT: x             COVID-19 PCR: NotDetec (10-19-21 @ 07:55)          Neuroimaging:  CT Head:   CT Angiography/Perfusion:   MRI Brain:   MRA Head/Neck:     Carotid US:         EEG:       Assessment:  This is a 50y Male with h/o         Discussed with Dr. Saenz        Plan:   - VEEG monitoring for better characterization and assessment  - Seizure precautions  - Ativan 2mg IV PRN for generalized tonic-clonic seizure lasting longer than 2 minutes, or two consecutive seizures without return to baseline in-between (ordered)  - CBC, CMP, Mg, CK, AED levels trough (ordered)  - Keep Mg above 2    Plan discussed with patient in details, all questions answered.    Discussed with nursing team, hospitalist, and PA. Neurology/Epilepsy Consult:    KESHAV MONROE 50y Male  MRN-[id number]    Patient is a 50y old left-handed Male who presents with a chief complaint of Breakthrough Seizure (19 Oct 2021 11:22)        HPI: History obtained from patient. EMR and outpatient records reviewed.  Patient has h/o seizures since childhood, but states was not started on seizure medications until 21 yo. He was tried on various AEDs, diagnosed with Left mesial temporal sclerosis, and in  underwent Left anterior temporal lobectomy.   He was most recently admitted to our Epilepsy Unit in 2020 after reporting multiple episodes of waking up with tongue bite and bladder incontinence while off AED (self-discontinued). Patient had VEEG (see report below) and agreed to re-start medications. He was put on Aptiom and dose was titrated as outpatient.   In mid 2021 patient decided to stop taking Aptiom and use OTC CBD oil instead. He denied having any difficulty tolerating Aptiom, just thought he may not need it any longer. On 2021 he woke up feeling achy with left arm pain and body aches, + bladder incontinence. He was evaluated in the ED at [facility], felt better in several hours and discharged.  It must be noted that in the past patient self-discontinued AED multiple times. Also, during previous admissions for VEEG patient had nocturnal electrographical seizures that he was not aware of.  Patient is being followed as outpatient by Dr. Saenz.    Previous AED trials: Dilantin, Lamictal, Keppra, Trileptal,           PAST MEDICAL & SURGICAL HISTORY:  Refractory epilepsy  Left mesial temporal sclerosis  Left anterior temporal lobectomy   Mood disorder          FAMILY HISTORY:  Uncle - epilepsy      Social History:  Lives alone  Works FT        Allergy:  No Known Allergies        Home Medications:  Aptiom 600 mg oral tablet: 3 tab(s) orally once a day (at bedtime) (19 Oct 2021 13:16)        MEDICATIONS  (STANDING):  chlorhexidine 4% Liquid 1 Application(s) Topical <User Schedule>  enoxaparin Injectable 40 milliGRAM(s) SubCutaneous at bedtime  eslicarbazepine 1800 milliGRAM(s) Oral at bedtime    MEDICATIONS  (PRN):  acetaminophen   Tablet .. 650 milliGRAM(s) Oral every 6 hours PRN Temp greater or equal to 38C (100.4F), Mild Pain (1 - 3)  LORazepam   Injectable 2 milliGRAM(s) IV Push three times a day PRN generalized tonic-clonic seizure lasting longer than 2 minutes  melatonin 3 milliGRAM(s) Oral at bedtime PRN Insomnia  ondansetron Injectable 4 milliGRAM(s) IV Push every 8 hours PRN Nausea and/or Vomiting        Review of systems:    Constitutional: No fever, weight loss or fatigue    Eyes: No eye pain or discharge  ENMT:  No difficulty hearing; No sinus or throat pain  Neck: No pain or stiffness  Respiratory: No cough, wheezing, chills or hemoptysis  Cardiovascular: No chest pain, palpitations, shortness of breath, dyspnea on exertion  Gastrointestinal: No abdominal pain, nausea, vomiting or hematemesis; No diarrhea or constipation.   Genitourinary: No dysuria, frequency, hematuria or incontinence  Neurological: As per HPI  Skin: No rashes or lesions   Endocrine: No heat or cold intolerance; No hair loss  Musculoskeletal: No joint pain or swelling  Psychiatric: No depression, anxiety, mood swings  Heme/Lymph: No easy bruising or bleeding gums        T(F): 96.3 (10-19-21 @ 09:23), Max: 97.3 (10-19-21 @ 07:28)  HR: 51 (10-19-21 @ 09:23) (51 - 82)  BP: 115/73 (10-19-21 @ 09:23) (115/73 - 133/85)  RR: 18 (10-19-21 @ 09:23) (18 - 20)  SpO2: 99% (10-19-21 @ 09:23) (99% - 100%)        Neurologic Examination:  General:  Appearance is consistent with chronologic age.  No abnormal facies.   General: The patient is oriented to person, place, time and date.  Recent and remote memory intact.  Follows 4-step directions. Fund of knowledge is intact and normal.  Language with normal repetition, comprehension and naming.  Nondysarthric.    Cranial nerves: EOMI w/o nystagmus, skew or reported double vision.  PERRL.  No ptosis/weakness of eyelid closure.  Facial sensation is normal with normal bite.  No facial asymmetry.  Hearing grossly intact b/l.  Palate elevates midline.  Tongue midline.  Motor examination:   Normal tone, bulk and range of motion.  No tenderness, twitching, tremors or involuntary movements.  Formal Muscle Strength Testin/5 UE; 5/5 LE.  No observable drift.  Reflexes:   2+ b/l pectoralis, biceps, triceps, brachioradialis, patella and Achilles.  Plantar response downgoing b/l.  Jaw jerk, Keith, clonus absent.  Sensory examination:   Intact to light touch and pinprick, pain, temperature and proprioception and vibration in all extremities.  Cerebellum:   FTN/HKS intact with normal ALEXIA in all limbs.  No dysmetria or dysdiadokinesia.  Gait narrow based and normal.        Labs:                         13.6   4.83  )-----------( 241      ( 19 Oct 2021 09:00 )             42.3     10-19    140  |  103  |  12  ----------------------------<  94  5.1<H>   |  27  |  1.4    Ca    10.2<H>      19 Oct 2021 09:00  Mg     1.9     10-19    TPro  7.4  /  Alb  4.9  /  TBili  0.4  /  DBili  x   /  AST  71<H>  /  ALT  33  /  AlkPhos  80  10-19    LIVER FUNCTIONS - ( 19 Oct 2021 09:00 )  Alb: 4.9 g/dL / Pro: 7.4 g/dL / ALK PHOS: 80 U/L / ALT: 33 U/L / AST: 71 U/L / GGT: x             COVID-19 PCR: NotDetec (10-19-21 @ 07:55)          Neuroimaging:  CT Head:   CT Angiography/Perfusion:   MRI Brain:   MRA Head/Neck:     Carotid US:         EEG:   VEEG  - 2021 -  left FT focal slowing, large to frequent number of left FT sharps and spikes, 3 left FT seizures from his sleep. that he did not reportne seizure last night again in his sleep X 45 seconds. He did not report it and there was no clear clinical change in his behavior  two brief left FT seizures from his sleep. that he did not report    VEEG  - 2020 - borderline to mile left hemispheric focal slowing and breach artifact, large number of left FT spikes  VEEG  - 2018 - left hemispheric focal slowing, left breach artifact,, large number of left temporal sharps and spikes  REEG 2018 - left mid-frontal slowing, frequent left frontal and frontopolar spikes and after going slow waves.  VEEG  - 2013 - left FT slowing  VEEG  - 2012 - 2 nocturnal electrographical partial seizures form Left FT area with secondary generalization, Left FT sharp transients, Left FT slowing      Assessment:  This is a 50y Male with h/o         Discussed with Dr. Saenz        Plan:   - VEEG monitoring for better characterization and assessment  - Seizure precautions  - Ativan 2mg IV PRN for generalized tonic-clonic seizure lasting longer than 2 minutes, or two consecutive seizures without return to baseline in-between (ordered)  - CBC, CMP, Mg, CK, AED levels trough (ordered)  - Keep Mg above 2    Plan discussed with patient in details, all questions answered.    Discussed with nursing team, hospitalist, and PA. Neurology/Epilepsy Consult:    KESHAV MONROE 50y Male  MRN-[id number]    Patient is a 50y old left-handed Male who presents with a chief complaint of Breakthrough Seizure (19 Oct 2021 11:22)        HPI: History obtained from patient. EMR and outpatient records reviewed.  Patient has h/o seizures since childhood, but states was not started on seizure medications until 19 yo. He was tried on various AEDs, diagnosed with Left mesial temporal sclerosis, and in  underwent Left anterior temporal lobectomy.   He was most recently admitted to our Epilepsy Unit in 2020 after reporting multiple episodes of waking up with tongue bite and bladder incontinence while off AED (self-discontinued). Patient had VEEG (see report below) and agreed to re-start medications. He was put on Aptiom and dose was titrated as outpatient.   In mid 2021 patient decided to stop taking Aptiom and use OTC CBD oil instead. He denied having any difficulty tolerating Aptiom, just thought he may not need it any longer. On 2021 he woke up feeling achy with left arm pain and body aches, + bladder incontinence. He was evaluated in the ED at [facility], felt better in several hours and discharged.  It must be noted that in the past patient self-discontinued AED multiple times. Also, during previous admissions for VEEG patient had nocturnal electrographical seizures that he was not aware of.  Patient is being followed as outpatient by Dr. Saenz.    Previous AED trials: Dilantin, Lamictal, Keppra, Trileptal,           PAST MEDICAL & SURGICAL HISTORY:  Refractory epilepsy  Left mesial temporal sclerosis  Left anterior temporal lobectomy   Mood disorder          FAMILY HISTORY:  Uncle - epilepsy      Social History:  Lives alone  Works FT        Allergy:  No Known Allergies        Home Medications:  Aptiom 600 mg oral tablet: 3 tablets orally once a day (at bedtime)         MEDICATIONS  (STANDING):  chlorhexidine 4% Liquid 1 Application(s) Topical <User Schedule>  enoxaparin Injectable 40 milliGRAM(s) SubCutaneous at bedtime  eslicarbazepine 1800 milliGRAM(s) Oral at bedtime    MEDICATIONS  (PRN):  acetaminophen   Tablet .. 650 milliGRAM(s) Oral every 6 hours PRN Temp greater or equal to 38C (100.4F), Mild Pain (1 - 3)  LORazepam   Injectable 2 milliGRAM(s) IV Push three times a day PRN generalized tonic-clonic seizure lasting longer than 2 minutes  melatonin 3 milliGRAM(s) Oral at bedtime PRN Insomnia  ondansetron Injectable 4 milliGRAM(s) IV Push every 8 hours PRN Nausea and/or Vomiting          T(F): 96.3 (10-19-21 @ 09:23), Max: 97.3 (10-19-21 @ 07:28)  HR: 51 (10-19-21 @ 09:23) (51 - 82)  BP: 115/73 (10-19-21 @ 09:23) (115/73 - 133/85)  RR: 18 (10-19-21 @ 09:23) (18 - 20)  SpO2: 99% (10-19-21 @ 09:23) (99% - 100%)        Neurologic Examination:  General:  Appearance is consistent with chronologic age.  No abnormal facies.   General: The patient is oriented to person, place, time and date.  Recent and remote memory intact.  Follows 4-step directions. Fund of knowledge is intact and normal. Language is slow with normal repetition, occasional difficulty with word finding.     Cranial nerves: EOMI w/o nystagmus, skew or reported double vision.  PERRL.  No ptosis/weakness of eyelid closure.  Facial sensation is normal with normal bite.  Minimal facial asymmetry.  Hearing grossly intact b/l.  Palate elevates midline.  Tongue midline.  Motor examination:   Normal tone, bulk and range of motion.  No tenderness, twitching, tremors or involuntary movements.  Formal Muscle Strength Testin/5 UE; 5/5 LE.  No observable drift.  Reflexes:   2+ b/l   Sensory examination:   Intact to light touch and pinprick, pain, temperature and proprioception and vibration in all extremities.  Cerebellum:   FTN/HKS intact with normal ALEXIA in all limbs.  No dysmetria or dysdiadokinesia.  Gait narrow based and normal.          Labs:                         13.6   4.83  )-----------( 241      ( 19 Oct 2021 09:00 )             42.3     10-19    140  |  103  |  12  ----------------------------<  94  5.1<H>   |  27  |  1.4    Ca    10.2<H>      19 Oct 2021 09:00  Mg     1.9     10-19    TPro  7.4  /  Alb  4.9  /  TBili  0.4  /  DBili  x   /  AST  71<H>  /  ALT  33  /  AlkPhos  80  10-19    LIVER FUNCTIONS - ( 19 Oct 2021 09:00 )  Alb: 4.9 g/dL / Pro: 7.4 g/dL / ALK PHOS: 80 U/L / ALT: 33 U/L / AST: 71 U/L / GGT: x           Creatine Kinase, Serum: 3146 U/L (10.19.21 @ 09:00)   COVID-19 PCR: NotDetec (10-19-21 @ 07:55)          EEG:   VEEG  - 2021 -  left FT focal slowing, large to frequent number of left FT sharps and spikes, 3 left FT seizures from his sleep that he did not report and there was no clear clinical change in his behavior  VEEG  - 2020 - borderline to mile left hemispheric focal slowing and breach artifact, large number of left FT spikes  VEEG  - 2018 - left hemispheric focal slowing, left breach artifact,, large number of left temporal sharps and spikes  REEG 2018 - left mid-frontal slowing, frequent left frontal and frontopolar spikes and after going slow waves.  VEEG  - 2013 - left FT slowing  VEEG  - 2012 - 2 nocturnal electrographical partial seizures form Left FT area with secondary generalization, Left FT sharp transients, Left FT slowing          Assessment:  This is a 50y Male with h/o refractory epilepsy, Left mesial temporal sclerosis, Left anterior temporal lobectomy, mood disorder, who reports multiple episodes of altered mental status, some associated with epigastric discomfort in the last week. Patient also had 1 episode of waking up from sleep with bladder incontinence and feeling achy that happened the night after getting 1st dose of Pfizer vaccine. He reports being compliant with Aptiom, denies any missed doses.        Discussed with Dr. Saenz        Plan:   - VEEG monitoring for further characterization and treatment plan (patient is waiting for EMU bed)  - Seizure precautions  - Continue pre-admission dose of Aptiom 1800mg QHS (ordered)  - Ativan 2mg IV PRN for generalized tonic-clonic seizure lasting longer than 2 minutes, or two consecutive seizures without return to baseline in-between (ordered)  - CBC, CMP, Mg, CK, AED level trough (ordered)  - Keep Mg above 2    Plan discussed with patient in details, all questions answered.    Discussed with nursing team, hospitalist, ED attending, and bed management. Neurology/Epilepsy Consult:    KESHAV MONROE 50y Male  MRN-[id number]    Patient is a 50y old left-handed Male who presents with a chief complaint of Breakthrough Seizure (19 Oct 2021 11:22)        HPI: History obtained from patient. EMR and outpatient records reviewed.  Patient has h/o seizures since childhood, but states was not started on seizure medications until 21 yo. He was tried on various AEDs, diagnosed with Left mesial temporal sclerosis, and in  underwent Left anterior temporal lobectomy.   He was most recently admitted to our Epilepsy Unit in 2021. In mid 2021 patient decided to stop taking Aptiom and use OTC CBD oil instead. He denied having any difficulty tolerating Aptiom, just thought he may not need it any longer. About 2 weeks later he woke up feeling achy with left arm pain and body aches, + bladder incontinence. He was evaluated in the ED at [facility], felt better in several hours and discharged. He had several nocturnal seizures during VEEG that he was not aware of see reports below).  Patient Aptiom was re-started at that time and dose was titrated as outpatient. Patient reports being compliant with medication lately, denies any missed doses. Patient is being followed as outpatient by Dr. Saenz.    Patient received the 1st dose of Pfizer vaccine 10/11/2021. The same night he woke up feeling dazed, and then had difficulty falling asleep. In AM he realized that had bladder incontinence during sleep and felt achy, typical for him after a seizure. He rested for the remainder of the day and went to work in the evening. While sitting at work he felt very confused therefore went to the local ED (lives on [address]). He was discharged in a few hours. In the next several days patient had other episodes of waking up with chills, being confused, felt his heart was very slow, experienced epigastric discomfort. He was seen in the ED near his home 3 more times in the last few days, was not admitted.  Patient contacted Dr. Saenz and advised to go to [facility] for EMU admission.    Previous AED trials: Dilantin, Lamictal, Keppra, Trileptal,           PAST MEDICAL & SURGICAL HISTORY:  Refractory epilepsy  Left mesial temporal sclerosis  Left anterior temporal lobectomy   Mood disorder          FAMILY HISTORY:  Uncle - epilepsy      Social History:  Lives alone  Works FT        Allergy:  No Known Allergies        Home Medications:  Aptiom 600 mg oral tablet: 3 tablets orally once a day (at bedtime)         MEDICATIONS  (STANDING):  chlorhexidine 4% Liquid 1 Application(s) Topical <User Schedule>  enoxaparin Injectable 40 milliGRAM(s) SubCutaneous at bedtime  eslicarbazepine 1800 milliGRAM(s) Oral at bedtime    MEDICATIONS  (PRN):  acetaminophen   Tablet .. 650 milliGRAM(s) Oral every 6 hours PRN Temp greater or equal to 38C (100.4F), Mild Pain (1 - 3)  LORazepam   Injectable 2 milliGRAM(s) IV Push three times a day PRN generalized tonic-clonic seizure lasting longer than 2 minutes  melatonin 3 milliGRAM(s) Oral at bedtime PRN Insomnia  ondansetron Injectable 4 milliGRAM(s) IV Push every 8 hours PRN Nausea and/or Vomiting          T(F): 96.3 (10-19-21 @ 09:23), Max: 97.3 (10-19-21 @ 07:28)  HR: 51 (10-19-21 @ 09:23) (51 - 82)  BP: 115/73 (10-19-21 @ 09:23) (115/73 - 133/85)  RR: 18 (10-19-21 @ 09:23) (18 - 20)  SpO2: 99% (10-19-21 @ 09:23) (99% - 100%)        Neurologic Examination:  General:  Appearance is consistent with chronologic age.  No abnormal facies.   General: The patient is oriented to person, place, time and date.  Recent and remote memory intact.  Follows 4-step directions. Fund of knowledge is intact and normal. Language is slow with normal repetition, occasional difficulty with word finding.     Cranial nerves: EOMI w/o nystagmus, skew or reported double vision.  PERRL.  No ptosis/weakness of eyelid closure.  Facial sensation is normal with normal bite.  Minimal facial asymmetry.  Hearing grossly intact b/l.  Palate elevates midline.  Tongue midline.  Motor examination:   Normal tone, bulk and range of motion.  No tenderness, twitching, tremors or involuntary movements.  Formal Muscle Strength Testin/5 UE; 5/5 LE.  No observable drift.  Reflexes:   2+ b/l   Sensory examination:   Intact to light touch and pinprick, pain, temperature and proprioception and vibration in all extremities.  Cerebellum:   FTN/HKS intact with normal ALEXIA in all limbs.  No dysmetria or dysdiadokinesia.  Gait narrow based and normal.          Labs:                         13.6   4.83  )-----------( 241      ( 19 Oct 2021 09:00 )             42.3     10-19    140  |  103  |  12  ----------------------------<  94  5.1<H>   |  27  |  1.4    Ca    10.2<H>      19 Oct 2021 09:00  Mg     1.9     10-19    TPro  7.4  /  Alb  4.9  /  TBili  0.4  /  DBili  x   /  AST  71<H>  /  ALT  33  /  AlkPhos  80  10-19    LIVER FUNCTIONS - ( 19 Oct 2021 09:00 )  Alb: 4.9 g/dL / Pro: 7.4 g/dL / ALK PHOS: 80 U/L / ALT: 33 U/L / AST: 71 U/L / GGT: x           Creatine Kinase, Serum: 3146 U/L (10.19.21 @ 09:00)   COVID-19 PCR: NotDetec (10-19-21 @ 07:55)          EEG:   VEEG  - 2021 -  left FT focal slowing, large to frequent number of left FT sharps and spikes, 3 left FT seizures from his sleep that he did not report and there was no clear clinical change in his behavior  VEEG  - 2020 - borderline to mile left hemispheric focal slowing and breach artifact, large number of left FT spikes  VEEG  - 2018 - left hemispheric focal slowing, left breach artifact,, large number of left temporal sharps and spikes  REEG 2018 - left mid-frontal slowing, frequent left frontal and frontopolar spikes and after going slow waves.  VEEG  - 2013 - left FT slowing  VEEG  - 2012 - 2 nocturnal electrographical partial seizures form Left FT area with secondary generalization, Left FT sharp transients, Left FT slowing          Assessment:  This is a 50y Male with h/o refractory epilepsy, Left mesial temporal sclerosis, Left anterior temporal lobectomy, mood disorder, who reports multiple episodes of altered mental status, some associated with epigastric discomfort in the last week. Patient also had 1 episode of waking up from sleep with bladder incontinence and feeling achy that happened the night after getting 1st dose of Pfizer vaccine. He reports being compliant with Aptiom, denies any missed doses.        Discussed with Dr. Saenz        Plan:   - VEEG monitoring for further characterization and treatment plan (patient is waiting for EMU bed)  - Seizure precautions  - Continue pre-admission dose of Aptiom 1800mg QHS (ordered)  - Ativan 2mg IV PRN for generalized tonic-clonic seizure lasting longer than 2 minutes, or two consecutive seizures without return to baseline in-between (ordered)  - CBC, CMP, Mg, CK, AED level trough (ordered)  - Keep Mg above 2    Plan discussed with patient in details, all questions answered.    Discussed with nursing team, hospitalist, ED attending, and bed management. Neurology/Epilepsy Consult:    KESHAV MONROE 50y Male  MRN-[id number]    Patient is a 50y old left-handed Male who presents with a chief complaint of Breakthrough Seizure (19 Oct 2021 11:22)        HPI: History obtained from patient. EMR and outpatient records reviewed.  Patient has h/o seizures since childhood, but states was not started on seizure medications until 21 yo. He was tried on various AEDs, diagnosed with Left mesial temporal sclerosis, and in  underwent Left anterior temporal lobectomy.   He was most recently admitted to our Epilepsy Unit in 2021. In mid 2021 patient decided to stop taking Aptiom and use OTC CBD oil instead. He denied having any difficulty tolerating Aptiom, just thought he may not need it any longer. About 2 weeks later he woke up feeling achy with left arm pain and body aches, + bladder incontinence. He was evaluated in the ED at [facility], felt better in several hours and discharged. He had several nocturnal seizures during VEEG that he was not aware of (see reports below).  Patient's Aptiom was re-started at that time and dose was titrated as outpatient. Patient reports being compliant with medication lately, denies any missed doses. Patient is being followed as outpatient by Dr. Saenz.    Patient received the 1st dose of Pfizer vaccine 10/11/2021. The same night he woke up feeling dazed, and then had difficulty falling asleep. In AM he realized that had bladder incontinence during sleep and felt achy, typical for him after a seizure. He rested for the remainder of the day and went to work in the evening. While sitting at work he felt very confused therefore went to the local ED (lives on [address]). He was discharged in a few hours. In the next several days patient had other episodes of waking up with chills, being confused, felt his heart was very slow, experienced epigastric discomfort. He was seen in the ED near his home 3 more times in the last few days, was not admitted.  Patient contacted Dr. Saenz and advised to go to [facility] for EMU admission.    Previous AED trials: Dilantin, Lamictal, Keppra, Trileptal,           PAST MEDICAL & SURGICAL HISTORY:  Refractory epilepsy  Left mesial temporal sclerosis  Left anterior temporal lobectomy   Mood disorder          FAMILY HISTORY:  Uncle - epilepsy      Social History:  Lives alone  Works FT        Allergy:  No Known Allergies        Home Medications:  Aptiom 600 mg oral tablet: 3 tablets orally once a day (at bedtime)         MEDICATIONS  (STANDING):  chlorhexidine 4% Liquid 1 Application(s) Topical <User Schedule>  enoxaparin Injectable 40 milliGRAM(s) SubCutaneous at bedtime  eslicarbazepine 1800 milliGRAM(s) Oral at bedtime    MEDICATIONS  (PRN):  acetaminophen   Tablet .. 650 milliGRAM(s) Oral every 6 hours PRN Temp greater or equal to 38C (100.4F), Mild Pain (1 - 3)  LORazepam   Injectable 2 milliGRAM(s) IV Push three times a day PRN generalized tonic-clonic seizure lasting longer than 2 minutes  melatonin 3 milliGRAM(s) Oral at bedtime PRN Insomnia  ondansetron Injectable 4 milliGRAM(s) IV Push every 8 hours PRN Nausea and/or Vomiting          T(F): 96.3 (10-19-21 @ 09:23), Max: 97.3 (10-19-21 @ 07:28)  HR: 51 (10-19-21 @ 09:23) (51 - 82)  BP: 115/73 (10-19-21 @ 09:23) (115/73 - 133/85)  RR: 18 (10-19-21 @ 09:23) (18 - 20)  SpO2: 99% (10-19-21 @ 09:23) (99% - 100%)        Neurologic Examination:  General:  Appearance is consistent with chronologic age.  No abnormal facies.   General: The patient is oriented to person, place, time and date.  Recent and remote memory intact.  Follows 4-step directions. Fund of knowledge is intact and normal. Language is slow with normal repetition, occasional difficulty with word finding.     Cranial nerves: EOMI w/o nystagmus, skew or reported double vision.  PERRL.  No ptosis/weakness of eyelid closure.  Facial sensation is normal with normal bite.  Minimal facial asymmetry.  Hearing grossly intact b/l.  Palate elevates midline.  Tongue midline.  Motor examination:   Normal tone, bulk and range of motion.  No tenderness, twitching, tremors or involuntary movements.  Formal Muscle Strength Testin/5 UE; 5/5 LE.  No observable drift.  Reflexes:   2+ b/l   Sensory examination:   Intact to light touch and pinprick, pain, temperature and proprioception and vibration in all extremities.  Cerebellum:   FTN/HKS intact with normal ALEXIA in all limbs.  No dysmetria or dysdiadokinesia.  Gait narrow based and normal.          Labs:                         13.6   4.83  )-----------( 241      ( 19 Oct 2021 09:00 )             42.3     10-19    140  |  103  |  12  ----------------------------<  94  5.1<H>   |  27  |  1.4    Ca    10.2<H>      19 Oct 2021 09:00  Mg     1.9     10-19    TPro  7.4  /  Alb  4.9  /  TBili  0.4  /  DBili  x   /  AST  71<H>  /  ALT  33  /  AlkPhos  80  10-19    LIVER FUNCTIONS - ( 19 Oct 2021 09:00 )  Alb: 4.9 g/dL / Pro: 7.4 g/dL / ALK PHOS: 80 U/L / ALT: 33 U/L / AST: 71 U/L / GGT: x           Creatine Kinase, Serum: 3146 U/L (10.19.21 @ 09:00)   COVID-19 PCR: NotDetec (10-19-21 @ 07:55)          EEG:   VEEG  - 2021 -  left FT focal slowing, large to frequent number of left FT sharps and spikes, 3 left FT seizures from his sleep that he did not report and there was no clear clinical change in his behavior  VEEG  - 2020 - borderline to mile left hemispheric focal slowing and breach artifact, large number of left FT spikes  VEEG  - 2018 - left hemispheric focal slowing, left breach artifact,, large number of left temporal sharps and spikes  REEG 2018 - left mid-frontal slowing, frequent left frontal and frontopolar spikes and after going slow waves.  VEEG  - 2013 - left FT slowing  VEEG  - 2012 - 2 nocturnal electrographical partial seizures form Left FT area with secondary generalization, Left FT sharp transients, Left FT slowing          Assessment:  This is a 50y Male with h/o refractory epilepsy, Left mesial temporal sclerosis, Left anterior temporal lobectomy, mood disorder, who reports multiple episodes of altered mental status, some associated with epigastric discomfort in the last week. Patient also had 1 episode of waking up from sleep with bladder incontinence and feeling achy that happened the night after getting 1st dose of Pfizer vaccine. He reports being compliant with Aptiom, denies any missed doses.        Discussed with Dr. Saenz        Plan:   - VEEG monitoring for further characterization and treatment plan (patient is waiting for EMU bed)  - Seizure precautions  - Continue pre-admission dose of Aptiom 1800mg QHS (ordered)  - Ativan 2mg IV PRN for generalized tonic-clonic seizure lasting longer than 2 minutes, or two consecutive seizures without return to baseline in-between (ordered)  - CBC, CMP, Mg, CK, AED level trough (ordered)  - Keep Mg above 2    Plan discussed with patient in details, all questions answered.    Discussed with nursing team, hospitalist, ED attending, and bed management.

## 2021-10-19 NOTE — ED PROVIDER NOTE - OBJECTIVE STATEMENT
The pt is a 50y M w/ hx of seizure disorder on eslicarbazepine presenting for COVID swab, pt is scheduled for EEG upstairs today. Endorses feeling some SOB and chest discomfort recently, none present today. Denies headache, blurry vision, N/V, abdominal pain.

## 2021-10-19 NOTE — CONSULT NOTE ADULT - ATTENDING COMMENTS
agree with the Hx and exam.  Tate is S/P left temporal surgery for refractory epilepsy.  He is having some breakthrough seizures.  he also shows some issues with his mood.  He is here for R/O subclinical seizures   Will start the monitoring  No change in AED  seizure precaution

## 2021-10-19 NOTE — H&P ADULT - ASSESSMENT
50 yr old male with hx of seizure since childhood with refractory epilepsy despite trial on multiple AEDs, Mesial temporal sclerosis and is s/p left anterior temporal lobectomy 2001 admitted for breakthrough seizure.        INCOMPLETE NOTE    50 yr old male with hx of seizure since childhood with refractory epilepsy despite trial on multiple AEDs, Mesial temporal sclerosis and is s/p left anterior temporal lobectomy 2001 admitted for breakthrough seizure.     # Breakthrough Seizure  - AAOx3 at encounter  - c/w Aptiom   - seizure precaution   - Ativan 2mg IV PRN for generalized tonic-clonic seizure  - CBC, CMP, Mg, CK, AED levels trough   - VEEG  - f/u neurology recommendations    # DVT PPX, Lovenox    # DASH diet    # Ambulate as tolerated    # Full code   50 yr old male with hx of seizure since childhood with refractory epilepsy despite trial on multiple AEDs, Mesial temporal sclerosis and is s/p left anterior temporal lobectomy 2001 admitted for breakthrough seizure.     # Breakthrough Seizure  - admit to medicine service  - Neurology c/s for V-EEG monitoring  - AAOx3 at encounter  - AED per Neuro  - seizure precaution   - Ativan 2mg IV PRN for generalized tonic-clonic seizure  - CBC, CMP, Mg, CK, AED levels trough   - DVT PPX (Lovenox)  - CHG 4% QD and PRN     # DVT PPX, Lovenox    # DASH diet    # Ambulate as tolerated    # Full code   50 yr old male with hx of seizure since childhood with refractory epilepsy despite trial on multiple AEDs, Mesial temporal sclerosis and is s/p left anterior temporal lobectomy 2001 admitted for breakthrough seizure.     # Breakthrough Seizure  - admit to medicine service  - Neurology c/s for V-EEG monitoring  - AAOx3 at encounter  - AED per Neuro  - seizure precaution   - Ativan 2mg IV PRN for generalized tonic-clonic seizure  - CBC, CMP, Mg, CK, AED levels trough   - CHG 4% QD and PRN     # DVT PPX, Lovenox    # DASH diet    # Ambulate as tolerated    # Full code

## 2021-10-19 NOTE — ED PROVIDER NOTE - ATTENDING CONTRIBUTION TO CARE
I personally evaluated patient. I agree with the findings and plan with all documentation on chart except as documented  in my note.    49 y/o M PMHx Seizure disorder on eslicarbazepine presenting s/p nightly seizures. Patient complicant with medications. Patient spoke with Dr. Agrawal, who wants to perform a STAT EEG for him. No fever or signs of infectious etiology.  No injury. Patient denies headache, blurry vision, N/V, abdominal pain.  On exam, VS reviewed. Patient has a non focal neuro exam. IV placed, labs sent,. COVID done.  ED work up reviewed and results and plan of care discussed with patient. Patient requires admission for further work up, monitoring, and management. Need for admission discussed with patient.

## 2021-10-19 NOTE — ED ADULT NURSE NOTE - NS ED NURSE DC INFO COMPLEXITY
Simple: Patient demonstrates quick and easy understanding/Verbalized Understanding Verbalized Understanding

## 2021-10-19 NOTE — ED PROVIDER NOTE - CARE PLAN
Principal Discharge DX:	Encounter for laboratory testing for COVID-19 virus   1 Principal Discharge DX:	Encounter for laboratory testing for COVID-19 virus  Secondary Diagnosis:	Seizure disorder

## 2021-10-19 NOTE — H&P ADULT - NSHPPHYSICALEXAM_GEN_ALL_CORE
GENERAL: NAD, well-developed  HEAD:  Atraumatic, Normocephalic  EYES: EOMI, PERRLA, conjunctiva and sclera clear  ENT: Normal tympanic membrane. No nasal obstruction or discharge. No tonsillar exudate, swelling or erythema.  NECK: Supple, No JVD  CHEST/LUNG: Clear to auscultation bilaterally; No wheeze  HEART: Regular rate and rhythm; No murmurs, rubs, or gallops  ABDOMEN: Soft, Nontender, Nondistended; Bowel sounds present  EXTREMITIES:  2+ Peripheral Pulses, No clubbing, cyanosis, or edema  PSYCH: AAOx3  NEUROLOGY: non-focal  SKIN: No rashes or lesions Vital Signs (24 Hrs):  T(C): 35.7 (10-19-21 @ 09:23), Max: 36.3 (10-19-21 @ 07:28)  HR: 51 (10-19-21 @ 09:23) (51 - 82)  BP: 115/73 (10-19-21 @ 09:23) (115/73 - 133/85)  RR: 18 (10-19-21 @ 09:23) (18 - 20)  SpO2: 99% (10-19-21 @ 09:23) (99% - 100%)    GENERAL: NAD, well-developed  HEAD:  Atraumatic, Normocephalic  EYES: EOMI, PERRLA, conjunctiva and sclera clear  ENT: Normal tympanic membrane. No nasal obstruction or discharge. No tonsillar exudate, swelling or erythema.  NECK: Supple, No JVD  CHEST/LUNG: Clear to auscultation bilaterally; No wheeze  HEART: Regular rate and rhythm; No murmurs, rubs, or gallops  ABDOMEN: Soft, Nontender, Nondistended; Bowel sounds present  EXTREMITIES:  2+ Peripheral Pulses, No clubbing, cyanosis, or edema  PSYCH: AAOx3  NEUROLOGY: non-focal  SKIN: No rashes or lesions

## 2021-10-19 NOTE — ED PROVIDER NOTE - PATIENT PORTAL LINK FT
You can access the FollowMyHealth Patient Portal offered by [facility] by registering at the following website: [email or web address]. By joining [email or web address]’s FollowMyHealth portal, you will also be able to view your health information using other applications (apps) compatible with our system.

## 2021-10-20 PROCEDURE — 99232 SBSQ HOSP IP/OBS MODERATE 35: CPT

## 2021-10-20 PROCEDURE — 99221 1ST HOSP IP/OBS SF/LOW 40: CPT

## 2021-10-20 RX ADMIN — ESLICARBAZEPINE ACETATE 1800 MILLIGRAM(S): 800 TABLET ORAL at 21:33

## 2021-10-21 ENCOUNTER — TRANSCRIPTION ENCOUNTER (OUTPATIENT)
Age: 51
End: 2021-10-21

## 2021-10-21 PROCEDURE — 99231 SBSQ HOSP IP/OBS SF/LOW 25: CPT

## 2021-10-21 PROCEDURE — 95720 EEG PHY/QHP EA INCR W/VEEG: CPT

## 2021-10-21 PROCEDURE — 99232 SBSQ HOSP IP/OBS MODERATE 35: CPT

## 2021-10-21 RX ORDER — ESLICARBAZEPINE ACETATE 800 MG/1
3 TABLET ORAL
Qty: 270 | Refills: 3
Start: 2021-10-21 | End: 2022-10-15

## 2021-10-21 RX ORDER — ESLICARBAZEPINE ACETATE 800 MG/1
3 TABLET ORAL
Qty: 0 | Refills: 0 | DISCHARGE
Start: 2021-10-21

## 2021-10-21 RX ADMIN — ESLICARBAZEPINE ACETATE 1800 MILLIGRAM(S): 800 TABLET ORAL at 22:03

## 2021-10-21 NOTE — DISCHARGE NOTE PROVIDER - NSDCMRMEDTOKEN_GEN_ALL_CORE_FT
Aptiom 600 mg oral tablet: 3 tab(s) orally once a day (at bedtime)   eslicarbazepine 600 mg oral tablet: 3 tab(s) orally once a day (at bedtime)

## 2021-10-21 NOTE — DISCHARGE NOTE PROVIDER - HOSPITAL COURSE
50 yr old male with a pmhx of seizures since childhood with refractory epilepsy despite trial on multiple AEDs, Mesial temporal sclerosis and is s/p left anterior temporal lobectomy (2001) who presented to the ER for a COVID swab for elective V-EEG admission.  Patient reported that on 10/11/21 he received his COVID-19 vaccination.  He reported that soon after the vaccination, he began experiencing a worsening of his seizures, and experiencing breakthrough seizures.  He reported discussing this with his Neurologist, who advised the  patient to come in for V-EEG admission.  No complaints of CP/SOB.  No abdominal or urinary complaints.      # Breakthrough Seizure   - Patient placed on VEEG monitoring while hospitalized for further characterization and evaluation of treatment plan.  - Evaluated by Neurophysiology/Epilepsy team while hospitalized.   - Placed on seizure precautions and DVT prophylaxis while inpatient. 50 yr old male with a pmhx of seizures since childhood with refractory epilepsy despite trial on multiple AEDs, Mesial temporal sclerosis and is s/p left anterior temporal lobectomy (2001) who presented to the ER for a COVID swab for elective V-EEG admission.  Patient reported that on 10/11/21 he received his COVID-19 vaccination.  He reported that soon after the vaccination, he began experiencing a worsening of his seizures, and experiencing breakthrough seizures.  He reported discussing this with his Neurologist, who advised the  patient to come in for V-EEG admission.  No complaints of CP/SOB.  No abdominal or urinary complaints.        Patient placed on VEEG monitoring while home meds were maintained.  Pt was evaluated by Neurophysiology/Epilepsy team while hospitalized who recommended......   50 yr old male with a pmhx of seizures since childhood with refractory epilepsy despite trial on multiple AEDs, Mesial temporal sclerosis and is s/p left anterior temporal lobectomy (2001) who presented to the ER for a COVID swab for elective V-EEG admission.  Patient reported that on 10/11/21 he received his COVID-19 vaccination.  He reported that soon after the vaccination, he began experiencing a worsening of his seizures, and experiencing breakthrough seizures.  He reported discussing this with his Neurologist, who advised the  patient to come in for V-EEG admission.  No complaints of CP/SOB.  No abdominal or urinary complaints.        Patient placed on VEEG monitoring while home meds were maintained.  Pt was evaluated by Neurophysiology/Epilepsy team while hospitalized who recommended.Aptiom 1800mg QHS (pre-admission   patient was seen and examined today  feels better  Offers no other complaints  Constitutional: No fever, fatigue or weight loss.  Skin: No rash.  Eyes: No recent vision problems or eye pain.  ENT: No congestion, ear pain, or sore throat.  Endocrine: No thyroid problems.  Cardiovascular: No chest pain or palpation.  Respiratory: No cough, shortness of breath, congestion, or wheezing.  Gastrointestinal: No abdominal pain, nausea, vomiting, or diarrhea.  Genitourinary: No dysuria.  Musculoskeletal: No joint swelling.  Neurologic: No headache.  Vital Signs Last 24 Hrs  T(C): 36.7 (22 Oct 2021 05:30), Max: 36.7 (22 Oct 2021 05:30)  T(F): 98.1 (22 Oct 2021 05:30), Max: 98.1 (22 Oct 2021 05:30)  HR: 54 (22 Oct 2021 05:30) (54 - 73)  BP: 109/72 (22 Oct 2021 05:30) (109/72 - 122/77)  BP(mean): --  RR: 16 (22 Oct 2021 05:30) (16 - 16)  SpO2: --    PHYSICAL EXAM-  GENERAL: NAD,    HEAD:  Atraumatic, Normocephalic  EYES: EOMI, PERRLA, conjunctiva and sclera clear  NECK: Supple, No JVD, Normal thyroid  NERVOUS SYSTEM:  Alert & Oriented X3, Motor Strength 5/5 B/L upper and lower extremities; DTRs 2+ intact and symmetric  CHEST/LUNG: Clear to percussion bilaterally; No rales, rhonchi, wheezing, or rubs  HEART: Regular rate and rhythm; No murmurs, rubs, or gallops  ABDOMEN: Soft, Nontender, Nondistended; Bowel sounds present  EXTREMITIES:  2+ Peripheral Pulses, No clubbing, cyanosis, or edema  SKIN: No rashes or lesions  Hospital course, and discharge planning were discussed with patient,  in details.  all questions were answered.  seems to understand, and in agreement.  time 70 min

## 2021-10-21 NOTE — DISCHARGE NOTE PROVIDER - CARE PROVIDER_API CALL
Roberto Saenz)  Neurology  [address] [place] [address], [address]  [place] [address]  Phone: [phone]  Fax: [phone]  Follow Up Time: 1 week    PMD,   Phone: (   )    -  Fax: (   )    -  Follow Up Time: 1 week

## 2021-10-21 NOTE — DISCHARGE NOTE PROVIDER - NSDCCPCAREPLAN_GEN_ALL_CORE_FT
PRINCIPAL DISCHARGE DIAGNOSIS  Diagnosis: Breakthrough seizure  Assessment and Plan of Treatment: Patient admitted for VEEG monitoring for further characterization and evaluation of current treatment plan.      SECONDARY DISCHARGE DIAGNOSES  Diagnosis: Seizure disorder  Assessment and Plan of Treatment: Evaluated via VEEG montioring. Continued treatment with PO anti-convulsant therapy.     PRINCIPAL DISCHARGE DIAGNOSIS  Diagnosis: Breakthrough seizure  Assessment and Plan of Treatment: s/p VEEG      SECONDARY DISCHARGE DIAGNOSES  Diagnosis: Seizure disorder  Assessment and Plan of Treatment: cont meds  fup with neuro as OP     PRINCIPAL DISCHARGE DIAGNOSIS  Diagnosis: Breakthrough seizure  Assessment and Plan of Treatment: s/p VEEG  outpatient follow up with neurology as scheduled      SECONDARY DISCHARGE DIAGNOSES  Diagnosis: Seizure disorder  Assessment and Plan of Treatment: cont meds  fup with neuro as OP

## 2021-10-21 NOTE — DISCHARGE NOTE PROVIDER - PROVIDER TOKENS
PROVIDER:[TOKEN:[[id number]],FOLLOWUP:[1 week]],FREE:[LAST:[PMD],PHONE:[(   )    -],FAX:[(   )    -],FOLLOWUP:[1 week]]

## 2021-10-21 NOTE — PROGRESS NOTE ADULT - ASSESSMENT
50 yr old male with hx of seizure since childhood with refractory epilepsy despite trial on multiple AEDs, Mesial temporal sclerosis and is s/p left anterior temporal lobectomy 2001 admitted for breakthrough seizure.     # Breakthrough Seizure  -Continue with V-EEG monitoring  - AAOx3 at encounter  - AED per Neuro  - seizure precaution   - Ativan 2mg IV PRN for generalized tonic-clonic seizure  - VEEG  - CHG 4% QD and PRN     # DVT PPX, Lovenox    # DASH diet    # Ambulate as tolerated    # Full code  
50 yr old male with hx of seizure since childhood with refractory epilepsy despite trial on multiple AEDs, Mesial temporal sclerosis and is s/p left anterior temporal lobectomy 2001 admitted for breakthrough seizure.     # Breakthrough Seizure  -Continue with V-EEG monitoring  - AAOx3 at encounter  - AED per Neuro  - seizure precaution   - Ativan 2mg IV PRN for generalized tonic-clonic seizure  - CBC, CMP, Mg, CK, AED levels trough   - CHG 4% QD and PRN     # DVT PPX, Lovenox    # DASH diet    # Ambulate as tolerated    # Full code

## 2021-10-22 ENCOUNTER — APPOINTMENT (OUTPATIENT)
Dept: NEUROPSYCHOLOGY | Facility: CLINIC | Age: 51
End: 2021-10-22

## 2021-10-22 ENCOUNTER — TRANSCRIPTION ENCOUNTER (OUTPATIENT)
Age: 51
End: 2021-10-22

## 2021-10-22 VITALS
TEMPERATURE: 98 F | HEART RATE: 54 BPM | SYSTOLIC BLOOD PRESSURE: 109 MMHG | RESPIRATION RATE: 16 BRPM | DIASTOLIC BLOOD PRESSURE: 72 MMHG

## 2021-10-22 LAB — OXCARBAZEPINE SERPL-MCNC: 18 UG/ML — SIGNIFICANT CHANGE UP (ref 10–35)

## 2021-10-22 PROCEDURE — 99239 HOSP IP/OBS DSCHRG MGMT >30: CPT

## 2021-10-22 PROCEDURE — 95720 EEG PHY/QHP EA INCR W/VEEG: CPT

## 2021-10-22 PROCEDURE — 99231 SBSQ HOSP IP/OBS SF/LOW 25: CPT

## 2021-10-22 NOTE — EEG REPORT - NS EEG TEXT BOX
Epilepsy Attending Note:     KESHAV MONROE    50y Male  MRN [id number]    Vital Signs Last 24 Hrs  T(C): 36.7 (22 Oct 2021 05:30), Max: 36.7 (22 Oct 2021 05:30)  T(F): 98.1 (22 Oct 2021 05:30), Max: 98.1 (22 Oct 2021 05:30)  HR: 54 (22 Oct 2021 05:30) (54 - 73)  BP: 109/72 (22 Oct 2021 05:30) (109/72 - 122/77)  BP(mean): --  RR: 16 (22 Oct 2021 05:30) (16 - 16)  SpO2: --                MEDICATIONS  (STANDING):  chlorhexidine 4% Liquid 1 Application(s) Topical <User Schedule>  enoxaparin Injectable 40 milliGRAM(s) SubCutaneous at bedtime  eslicarbazepine 1800 milliGRAM(s) Oral at bedtime    MEDICATIONS  (PRN):  acetaminophen   Tablet .. 650 milliGRAM(s) Oral every 6 hours PRN Temp greater or equal to 38C (100.4F), Mild Pain (1 - 3)  LORazepam   Injectable 2 milliGRAM(s) IV Push three times a day PRN generalized tonic-clonic seizure lasting longer than 2 minutes  melatonin 3 milliGRAM(s) Oral at bedtime PRN Insomnia  ondansetron Injectable 4 milliGRAM(s) IV Push every 8 hours PRN Nausea and/or Vomiting            VEEG in the last 24 hours:    Background-------------   symmetrical, organized 8-9 hz    Focal and generalized slowing---------left FT focal slowing    Interictal activity------small to moderate number of left FT sharp waves/ spikes    Events--none    Seizures-----------  none    Impression: - abnormal as above    Plan - DC the monitoring. No change in AED

## 2021-10-22 NOTE — DISCHARGE NOTE NURSING/CASE MANAGEMENT/SOCIAL WORK - PATIENT PORTAL LINK FT
You can access the FollowMyHealth Patient Portal offered by [facility] by registering at the following website: [email or web address]. By joining [employer]’s FollowMyHealth portal, you will also be able to view your health information using other applications (apps) compatible with our system.

## 2021-10-22 NOTE — PROGRESS NOTE ADULT - SUBJECTIVE AND OBJECTIVE BOX
Epilepsy Attending Note:     KESHAV MONROE    50y Male  MRN [id number]    Vital Signs Last 24 Hrs  T(C): 35.6 (21 Oct 2021 05:00), Max: 36.8 (20 Oct 2021 21:46)  T(F): 96 (21 Oct 2021 05:00), Max: 98.2 (20 Oct 2021 21:46)  HR: 54 (21 Oct 2021 05:00) (54 - 73)  BP: 116/71 (21 Oct 2021 05:00) (113/58 - 118/65)  BP(mean): --  RR: 16 (21 Oct 2021 05:00) (16 - 16)  SpO2: --                MEDICATIONS  (STANDING):  chlorhexidine 4% Liquid 1 Application(s) Topical <User Schedule>  enoxaparin Injectable 40 milliGRAM(s) SubCutaneous at bedtime  eslicarbazepine 1800 milliGRAM(s) Oral at bedtime    MEDICATIONS  (PRN):  acetaminophen   Tablet .. 650 milliGRAM(s) Oral every 6 hours PRN Temp greater or equal to 38C (100.4F), Mild Pain (1 - 3)  LORazepam   Injectable 2 milliGRAM(s) IV Push three times a day PRN generalized tonic-clonic seizure lasting longer than 2 minutes  melatonin 3 milliGRAM(s) Oral at bedtime PRN Insomnia  ondansetron Injectable 4 milliGRAM(s) IV Push every 8 hours PRN Nausea and/or Vomiting            VEEG in the last 24 hours:    Background-------  symmetrical well organized reaching 8-9    Focal and generalized slowing--------left FT slowing    Interictal activity-------  small number of left FT sharp waves and spikes    Events--none    Seizures----none    Impression: abnormal as above    Plan -   will continue the monitoring. No change in AED. seizure precaution    
Epilepsy Team Discharge Note:    VEEG monitoring completed, patient is cleared for discharge from neurology standpoint.    Patient will be called with the follow up neurology appointment date with Dr. Saenz.    [address] [place] [address], [address]  [phone]    Discharge seizure medications are:  Aptiom 1800mg QHS (pre-admission dose)    Rx sent to the pharmacy.    Patient was also given Rx for CBC, CMP, AED levels trough to be done prior to follow up.    Detailed written and verbal instructions regarding seizure precautions and follow up plan are given to the patient, all questions answered. Patient verbalized understanding.    Discussed with medical and nursing teams.  
CC. Breakthrough Seizure  HPI.  Patient reports that she feels better.  Offers no other complaints              Constitutional: No fever, fatigue or weight loss.  Skin: No rash.  Eyes: No recent vision problems or eye pain.  ENT: No congestion, ear pain, or sore throat.  Endocrine: No thyroid problems.  Cardiovascular: No chest pain or palpation.  Respiratory: No cough, shortness of breath, congestion, or wheezing.  Gastrointestinal: No abdominal pain, nausea, vomiting, or diarrhea.  Genitourinary: No dysuria.  Musculoskeletal: No joint swelling.  Neurologic: No headache.      Vital Signs Last 24 Hrs  T(C): 35.6 (21 Oct 2021 05:00), Max: 36.8 (20 Oct 2021 21:46)  T(F): 96 (21 Oct 2021 05:00), Max: 98.2 (20 Oct 2021 21:46)  HR: 54 (21 Oct 2021 05:00) (54 - 73)  BP: 116/71 (21 Oct 2021 05:00) (113/58 - 118/65)  BP(mean): --  RR: 16 (21 Oct 2021 05:00) (16 - 16)  SpO2: --        PHYSICAL EXAM-  GENERAL: NAD,   HEAD:  Atraumatic, Normocephalic  EYES: EOMI, PERRLA, conjunctiva and sclera clear  NECK: Supple, No JVD, Normal thyroid  NERVOUS SYSTEM:  Alert & Oriented X3, Motor Strength 5/5 B/L upper and lower extremities; DTRs 2+ intact and symmetric  CHEST/LUNG: Clear to percussion bilaterally; No rales, rhonchi, wheezing, or rubs  HEART: Regular rate and rhythm; No murmurs, rubs, or gallops  ABDOMEN: Soft, Nontender, Nondistended; Bowel sounds present  EXTREMITIES:  2+ Peripheral Pulses, No clubbing, cyanosis, or edema  SKIN: No rashes or lesions                CARDIAC MARKERS ( 19 Oct 2021 18:59 )  x     / x     / 2341 U/L / x     / x                                      MEDICATIONS  (STANDING):  chlorhexidine 4% Liquid 1 Application(s) Topical <User Schedule>  enoxaparin Injectable 40 milliGRAM(s) SubCutaneous at bedtime  eslicarbazepine 1800 milliGRAM(s) Oral at bedtime    MEDICATIONS  (PRN):  acetaminophen   Tablet .. 650 milliGRAM(s) Oral every 6 hours PRN Temp greater or equal to 38C (100.4F), Mild Pain (1 - 3)  LORazepam   Injectable 2 milliGRAM(s) IV Push three times a day PRN generalized tonic-clonic seizure lasting longer than 2 minutes  melatonin 3 milliGRAM(s) Oral at bedtime PRN Insomnia  ondansetron Injectable 4 milliGRAM(s) IV Push every 8 hours PRN Nausea and/or Vomiting          Imaging Personally Reviewed:     [x ] YES  [ ] NO    Consultant(s) Notes Reviewed:  [x ] YES  [ ] NO    Care Discussed with Consultants/Other Providers [x ] YES  [ ] No medical contraindication for discharge  
CC. Breakthrough Seizure  HPI.  Patient reports that she feels better.  Offers no other complaints              Constitutional: No fever, fatigue or weight loss.  Skin: No rash.  Eyes: No recent vision problems or eye pain.  ENT: No congestion, ear pain, or sore throat.  Endocrine: No thyroid problems.  Cardiovascular: No chest pain or palpation.  Respiratory: No cough, shortness of breath, congestion, or wheezing.  Gastrointestinal: No abdominal pain, nausea, vomiting, or diarrhea.  Genitourinary: No dysuria.  Musculoskeletal: No joint swelling.  Neurologic: No headache.      Vital Signs Last 24 Hrs  T(C): 36.3 (10-20-21 @ 05:00), Max: 36.7 (10-19-21 @ 20:09)  T(F): 97.4 (10-20-21 @ 05:00), Max: 98 (10-19-21 @ 20:09)  HR: 55 (10-20-21 @ 05:00) (55 - 90)  BP: 117/64 (10-20-21 @ 05:00) (108/64 - 117/64)  BP(mean): --  RR: 16 (10-20-21 @ 05:00) (16 - 18)  SpO2: 99% (10-19-21 @ 21:15) (99% - 99%)        PHYSICAL EXAM-  GENERAL: NAD,   HEAD:  Atraumatic, Normocephalic  EYES: EOMI, PERRLA, conjunctiva and sclera clear  NECK: Supple, No JVD, Normal thyroid  NERVOUS SYSTEM:  Alert & Oriented X3, Motor Strength 5/5 B/L upper and lower extremities; DTRs 2+ intact and symmetric  CHEST/LUNG: Clear to percussion bilaterally; No rales, rhonchi, wheezing, or rubs  HEART: Regular rate and rhythm; No murmurs, rubs, or gallops  ABDOMEN: Soft, Nontender, Nondistended; Bowel sounds present  EXTREMITIES:  2+ Peripheral Pulses, No clubbing, cyanosis, or edema  SKIN: No rashes or lesions                                  13.6   4.83  )-----------( 241      ( 19 Oct 2021 09:00 )             42.3     10-19    140  |  103  |  12  ----------------------------<  94  5.1<H>   |  27  |  1.4    Ca    10.2<H>      19 Oct 2021 09:00  Mg     1.9     10-19    TPro  7.4  /  Alb  4.9  /  TBili  0.4  /  DBili  x   /  AST  71<H>  /  ALT  33  /  AlkPhos  80  10-19    CARDIAC MARKERS ( 19 Oct 2021 18:59 )  x     / x     / 2341 U/L / x     / x      CARDIAC MARKERS ( 19 Oct 2021 09:00 )  x     / x     / 3146 U/L / x     / x                      MEDICATIONS  (STANDING):  chlorhexidine 4% Liquid 1 Application(s) Topical <User Schedule>  enoxaparin Injectable 40 milliGRAM(s) SubCutaneous at bedtime  eslicarbazepine 1800 milliGRAM(s) Oral at bedtime    MEDICATIONS  (PRN):  acetaminophen   Tablet .. 650 milliGRAM(s) Oral every 6 hours PRN Temp greater or equal to 38C (100.4F), Mild Pain (1 - 3)  LORazepam   Injectable 2 milliGRAM(s) IV Push three times a day PRN generalized tonic-clonic seizure lasting longer than 2 minutes  melatonin 3 milliGRAM(s) Oral at bedtime PRN Insomnia  ondansetron Injectable 4 milliGRAM(s) IV Push every 8 hours PRN Nausea and/or Vomiting          Imaging Personally Reviewed:     [x ] YES  [ ] NO    Consultant(s) Notes Reviewed:  [x ] YES  [ ] NO    Care Discussed with Consultants/Other Providers [x ] YES  [ ] No medical contraindication for discharge

## 2021-10-29 ENCOUNTER — APPOINTMENT (OUTPATIENT)
Dept: NEUROSURGERY | Facility: CLINIC | Age: 51
End: 2021-10-29
Payer: COMMERCIAL

## 2021-10-29 ENCOUNTER — APPOINTMENT (OUTPATIENT)
Dept: NEUROLOGY | Facility: CLINIC | Age: 51
End: 2021-10-29
Payer: COMMERCIAL

## 2021-10-29 VITALS
HEIGHT: 73 IN | BODY MASS INDEX: 29.29 KG/M2 | TEMPERATURE: 97.9 F | OXYGEN SATURATION: 99 % | HEART RATE: 80 BPM | WEIGHT: 221 LBS | SYSTOLIC BLOOD PRESSURE: 128 MMHG | DIASTOLIC BLOOD PRESSURE: 79 MMHG

## 2021-10-29 VITALS
SYSTOLIC BLOOD PRESSURE: 129 MMHG | BODY MASS INDEX: 29.16 KG/M2 | HEIGHT: 73 IN | WEIGHT: 220 LBS | HEART RATE: 81 BPM | OXYGEN SATURATION: 99 % | TEMPERATURE: 97.9 F | DIASTOLIC BLOOD PRESSURE: 74 MMHG

## 2021-10-29 DIAGNOSIS — G40.219 LOCALIZATION-RELATED (FOCAL) (PARTIAL) SYMPTOMATIC EPILEPSY AND EPILEPTIC SYNDROMES WITH COMPLEX PARTIAL SEIZURES, INTRACTABLE, W/OUT STATUS EPILEPTICUS: ICD-10-CM

## 2021-10-29 PROCEDURE — 99204 OFFICE O/P NEW MOD 45 MIN: CPT | Mod: 57

## 2021-10-29 PROCEDURE — 99213 OFFICE O/P EST LOW 20 MIN: CPT

## 2021-10-30 NOTE — ASSESSMENT
[FreeTextEntry1] : S/P left anterior temporal lobectomy in 2002 for refractory epilepsy\par Recurrent breakthrough seizure in the last couple of years\par \par \par Plan\par level\par seizure diary\par ? of adding the second AED\par Neurosych test\par download the MRI in the system

## 2021-10-30 NOTE — PHYSICAL EXAM
[FreeTextEntry1] : A/A/Ox3\par + psychomotor slowing\par NOrmal CN\par No drift\par No dysmetria\par stable gait\par

## 2021-10-30 NOTE — HISTORY OF PRESENT ILLNESS
[FreeTextEntry1] : Tate is here to see me and also Dr Salgado .\par In the last few weeks he did have  some issues mainly started after his vaccination .In that period  I had received multiple phone calls from him . He was complaining of none specific symptoms such as becoming tense, having shivering like symptoms without fever, right arm pain..\par He was also occasionally sounding paranoidal and fearful.\par He did couple of visits to the ER and nothing was found. Eventually we had admitted him to the EMU and two nights of monitoring  which did not show any seizures but showed left FT /anterior temporal sharps\par He also had a CPK level done that was high\par He is in the process of doing his neuropsychology test \par Continues to have his issues with his memory\par He also reports occasionally waking up , feeling that his body is achy and also on occasions finds his bed wet.\par His mood is the same often in his opinion flat and not motivated\par \par

## 2021-11-09 PROBLEM — G40.219 GENERALIZED COMPLEX PARTIAL EPILEPSY WITH INTRACTABLE EPILEPSY: Status: ACTIVE | Noted: 2021-11-09

## 2021-11-09 NOTE — ASSESSMENT
[FreeTextEntry1] : 50 year old male with persistent left onset seizures after incomplete left temporal lobectomy.\par \par Patient to obtain MRI and send it to me for my evaluation\par Candidate for SEEG \par plan to discuss case at our [facility]\par while he is agreeable to surgical intervention, it would be advisable to discuss our plan together with his family\par plan for followup telehealth visit to discuss surgical plan after [facility] discussion

## 2021-11-09 NOTE — PHYSICAL EXAM
[Person] : oriented to person [Short Term Intact] : short term memory intact [Span Intact] : the attention span was normal [Fluency] : fluency intact [Current Events] : adequate knowledge of current events [Cranial Nerves Optic (II)] : visual acuity intact bilaterally,  pupils equal round and reactive to light [Cranial Nerves Facial (VII)] : face symmetrical [Cranial Nerves Vestibulocochlear (VIII)] : hearing was intact bilaterally [Motor Tone] : muscle tone was normal in all four extremities [Motor Strength] : muscle strength was normal in all four extremities [Involuntary Movements] : no involuntary movements were seen [No Muscle Atrophy] : normal bulk in all four extremities [Balance] : balance was intact [Sclera] : the sclera and conjunctiva were normal [Outer Ear] : the ears and nose were normal in appearance [Neck Appearance] : the appearance of the neck was normal [] : no respiratory distress [Abnormal Walk] : normal gait [Skin Color & Pigmentation] : normal skin color and pigmentation [Limited Balance] : balance was intact [Tremor] : no tremor present

## 2021-11-09 NOTE — HISTORY OF PRESENT ILLNESS
[> 3 months] : more  than 3 months [FreeTextEntry1] : 50 year old male with intractable epilepsy [de-identified] : This is a 50 year old gentleman with a history of refractory epilepsy who underwent left temporal lobectomy. I reviewed his case with Dr. Saenz and we believe him to be a potential candidate for further surgery.  The patient was worked up in the past at [facility], where eventually underwent a partial left temporal lobectomy.  However, after a very brief seizure-free interval, he began having seizures again.  His noninvasive workup shows left temporal onsets on VEEG and residual hippocampus on postop MRI.  We believe he would be good candidate for SEEG followed by possible laser ablation versus open resection depending upon the outcome of the noninvasive monitoring.  He is here to discuss surgical options.  He has had a recent MRI, but he did not bring it with him

## 2021-11-12 ENCOUNTER — APPOINTMENT (OUTPATIENT)
Dept: NEUROPSYCHOLOGY | Facility: CLINIC | Age: 51
End: 2021-11-12

## 2021-11-18 ENCOUNTER — APPOINTMENT (OUTPATIENT)
Dept: NEUROPSYCHOLOGY | Facility: CLINIC | Age: 51
End: 2021-11-18

## 2021-11-18 ENCOUNTER — OUTPATIENT (OUTPATIENT)
Dept: OUTPATIENT SERVICES | Facility: HOSPITAL | Age: 51
LOS: 1 days | Discharge: HOME | End: 2021-11-18

## 2021-11-18 DIAGNOSIS — G40.109 LOCALIZATION-RELATED (FOCAL) (PARTIAL) SYMPTOMATIC EPILEPSY AND EPILEPTIC SYNDROMES WITH SIMPLE PARTIAL SEIZURES, NOT INTRACTABLE, WITHOUT STATUS EPILEPTICUS: ICD-10-CM

## 2021-11-18 DIAGNOSIS — D33.2 BENIGN NEOPLASM OF BRAIN, UNSPECIFIED: Chronic | ICD-10-CM

## 2021-11-19 ENCOUNTER — APPOINTMENT (OUTPATIENT)
Dept: NEUROPSYCHOLOGY | Facility: CLINIC | Age: 51
End: 2021-11-19

## 2021-11-22 RX ORDER — CLOBAZAM 10 MG/1
10 TABLET ORAL
Qty: 15 | Refills: 3 | Status: ACTIVE | COMMUNITY
Start: 2021-11-11 | End: 1900-01-01

## 2022-02-11 ENCOUNTER — APPOINTMENT (OUTPATIENT)
Dept: NEUROLOGY | Facility: CLINIC | Age: 52
End: 2022-02-11
Payer: COMMERCIAL

## 2022-02-11 VITALS
WEIGHT: 223 LBS | TEMPERATURE: 98 F | SYSTOLIC BLOOD PRESSURE: 124 MMHG | OXYGEN SATURATION: 100 % | DIASTOLIC BLOOD PRESSURE: 84 MMHG | HEART RATE: 88 BPM | HEIGHT: 73 IN | BODY MASS INDEX: 29.55 KG/M2

## 2022-02-11 PROCEDURE — 99213 OFFICE O/P EST LOW 20 MIN: CPT

## 2022-02-12 NOTE — HISTORY OF PRESENT ILLNESS
[FreeTextEntry1] : Tate is here for the F/u\par He did have his neuropsychology test done that confirms his difficulty with the processing and the recent memory....\par He continues to work two jobs and in fact at one the jobs that required higher level of skills his function is lowered to a lower level to fit his abilities\par He had stopped or did not refill his clobazam and had multiple nocturnal events as usual presented with night bed wetting and or daytime muscle pain and tiredness.\par In general he is often waking up refreshed but during the day has excessive sleepiness . He could often find himself not motivated \par He is not describing himself as being depressed.\par Now that he is back on taking his two meds regularly  still not clear whether he is having seizures or not.\par He is having his usual social interactions. seeing her son every couple of weeks and sees and talks with meghana\par

## 2022-02-12 NOTE — PHYSICAL EXAM
[FreeTextEntry1] : A/A/Ox 3\par ++ psychomotor slowing\par + sleepy\par follows 4 step commands\par crosses the midline well\par No drift\par no dysmetria\par stable gait\par negative romberg

## 2022-02-12 NOTE — ASSESSMENT
[FreeTextEntry1] : S/P left temporal / hippocampal resective surgery 2001 for refractory seizure \par recurrent seizure\par \par \par plan\par discussed to consider surgical option\par will do levels\par will do sleep study

## 2022-02-18 NOTE — CONSULT NOTE ADULT - I WAS PHYSICALLY PRESENT FOR THE KEY PORTIONS OF THE EVALUATION AND MANAGEMENT (E/M) SERVICE PROVIDED.  I AGREE WITH THE ABOVE HISTORY, PHYSICAL, AND PLAN WHICH I HAVE REVIEWED AND EDITED WHERE APPROPRIATE
Attempted to call patient, mailbox full, unable to leave a message, will try again.   Statement Selected

## 2022-07-06 ENCOUNTER — APPOINTMENT (OUTPATIENT)
Dept: NEUROLOGY | Facility: CLINIC | Age: 52
End: 2022-07-06

## 2022-07-06 VITALS
OXYGEN SATURATION: 99 % | WEIGHT: 224 LBS | HEART RATE: 80 BPM | BODY MASS INDEX: 29.69 KG/M2 | SYSTOLIC BLOOD PRESSURE: 122 MMHG | TEMPERATURE: 97.6 F | DIASTOLIC BLOOD PRESSURE: 82 MMHG | HEIGHT: 73 IN

## 2022-07-06 PROCEDURE — 95816 EEG AWAKE AND DROWSY: CPT

## 2022-07-06 PROCEDURE — 99214 OFFICE O/P EST MOD 30 MIN: CPT

## 2022-07-08 NOTE — ASSESSMENT
[FreeTextEntry1] : S/P left  partial hippocampectomy for refractory seizure\par \par recurrence of partial seizure from the left side \par \par mood D/O\par \par \par plan\par we had again discussed extensively the next steps and different options\par wll increase the Aptiom to 2400 hs\par will do level and F/U

## 2022-07-08 NOTE — PHYSICAL EXAM
[FreeTextEntry1] : A/A/Ox3\par ++ psychomotor slowing\par appears tired\par Follows commands\par normal executive behavior\par has good memory of the current events\par 2/3 recall\par no drift\par no dysmetria\par stable gait

## 2022-07-08 NOTE — HISTORY OF PRESENT ILLNESS
[FreeTextEntry1] : Tate is here for the F/U\par He is here to go over his Neuropsychology test and also his F/U.\par He is continuing to have same issues:\par 1- lack of motivation\par 2- at times feeling not energetic\par 3- having difficulty with his memory\par 4- having episodes often/almost always waking up in a wet bed \par 5- episodes of feeling being in a dreamy state. again often in his transition to or from sleep\par Unfortunately he had also stopped taking the Onfi\par \par He did have his sleep study done that shows normal result . Still having nights that he reports not being able to sleep. Is not reporting any particular reason for it\par His blood test shows a es- carbazepine  level of 28\par He did have an EEG done today in the office that I had reviewed\par He is still having his regular and also part time job.\par Trying to go to the G[facility]\par he continues to be careful with his diet\par Has his regular F/U with the PMD

## 2022-08-30 NOTE — DISCHARGE NOTE PROVIDER - PROVIDER TOKENS
PROVIDER:[TOKEN:[[id number]]]

## 2022-12-16 NOTE — CONSULT NOTE ADULT - NSHPATTENDINGPLANDISCUSS_GEN_ALL_CORE
Propranolol was ordered for a 30 day supply on 12/7/2022. She has an appointment with Dr. Santoyo in March. Will send refills to last until her appointment.   patient

## 2023-02-22 ENCOUNTER — APPOINTMENT (OUTPATIENT)
Dept: NEUROLOGY | Facility: CLINIC | Age: 53
End: 2023-02-22
Payer: COMMERCIAL

## 2023-02-22 VITALS
OXYGEN SATURATION: 99 % | TEMPERATURE: 97.3 F | HEIGHT: 73 IN | SYSTOLIC BLOOD PRESSURE: 125 MMHG | DIASTOLIC BLOOD PRESSURE: 83 MMHG | BODY MASS INDEX: 27.83 KG/M2 | WEIGHT: 210 LBS | HEART RATE: 91 BPM

## 2023-02-22 PROCEDURE — 95816 EEG AWAKE AND DROWSY: CPT

## 2023-02-22 PROCEDURE — 99214 OFFICE O/P EST MOD 30 MIN: CPT

## 2023-02-22 RX ORDER — ESLICARBAZEPINE ACETATE 600 MG/1
600 TABLET ORAL
Qty: 90 | Refills: 2 | Status: DISCONTINUED | COMMUNITY
Start: 2022-09-01 | End: 2023-02-22

## 2023-02-22 RX ORDER — CENOBAMATE 50MG-100MG
14 X 50 MG & KIT ORAL
Qty: 1 | Refills: 0 | Status: DISCONTINUED | COMMUNITY
Start: 2022-10-04 | End: 2023-02-22

## 2023-02-22 RX ORDER — CLOBAZAM 10 MG/1
10 TABLET ORAL
Qty: 90 | Refills: 1 | Status: DISCONTINUED | COMMUNITY
Start: 2022-01-26 | End: 2023-02-22

## 2023-02-22 RX ORDER — CENOBAMATE 12.5-25MG
14 X 12.5 MG & KIT ORAL
Qty: 1 | Refills: 0 | Status: DISCONTINUED | COMMUNITY
Start: 2022-10-04 | End: 2023-02-22

## 2023-04-19 NOTE — H&P ADULT - NSHPROSALLOTHERNEGRD_GEN_ALL_CORE
Patient returns call and below message reviewed with patient.  Verbalized understanding.     All other review of systems negative, except as noted in HPI

## 2023-06-05 ENCOUNTER — RX RENEWAL (OUTPATIENT)
Age: 53
End: 2023-06-05

## 2023-08-14 NOTE — PHYSICAL EXAM
[FreeTextEntry1] : A/A/Ox3\par good mood\par slight psychomotor slowing\par follows 4 step commands\par occasionally has difficulty with word finding\par No drift\par no dysmetria\par slight head tremor\par stable gait

## 2023-08-14 NOTE — ASSESSMENT
[FreeTextEntry1] : S/P left temporal ( hippocampal resective surgery for refractory epilepsy\par recurrent seizures\par \par plan\par continue current level\par F/U

## 2023-08-14 NOTE — HISTORY OF PRESENT ILLNESS
[FreeTextEntry1] : Tate is here for the F/U\par He is not reporting any seizures since being on this combination of Aptiom and X-copri\par He is on only 100 mg of Xcopri .\par He did have a blood test done that shows a level of 24 for the Ebtte-carbazepine\par The Na+ is 138 and the CMP is normal\par his sleep pattern is not the best\par His mood appears to be good/better\par Otherwise he is at his baseline . Not as motivated as he should be . continues to procrastinate.\par His general health is good\par Does not do that much of exercise\par today had an EEG done in the office.\par It shows left hemispheric focal slowing and relatively better organized than before

## 2023-08-15 ENCOUNTER — APPOINTMENT (OUTPATIENT)
Dept: NEUROLOGY | Facility: CLINIC | Age: 53
End: 2023-08-15
Payer: COMMERCIAL

## 2023-08-15 DIAGNOSIS — G40.109 LOCALIZATION-RELATED (FOCAL) (PARTIAL) SYMPTOMATIC EPILEPSY AND EPILEPTIC SYNDROMES WITH SIMPLE PARTIAL SEIZURES, NOT INTRACTABLE, W/OUT STATUS EPILEPTICUS: ICD-10-CM

## 2023-08-15 PROCEDURE — 99214 OFFICE O/P EST MOD 30 MIN: CPT

## 2023-08-17 RX ORDER — ESLICARBAZEPINE ACETATE 800 MG/1
800 TABLET ORAL
Qty: 270 | Refills: 3 | Status: DISCONTINUED | COMMUNITY
Start: 2020-07-10 | End: 2023-08-17

## 2023-08-19 PROBLEM — G40.109 PARTIAL EPILEPSY: Status: ACTIVE | Noted: 2020-04-15

## 2023-08-19 NOTE — HISTORY OF PRESENT ILLNESS
[FreeTextEntry1] : Tate is here for the F/U He is doing much better on the X-copri, although it is a small dose  He says he is not having nocturnal episodes of bladder incontinence.He is also not having days that he would wake up with body ache He did have an incident at his work which . He stayed out of work on workman com. He also did MRI of the L-Spine that shows multilevel degenerative disease He has a neurologist through his insurance t address this issue No change in the mood and memory

## 2023-08-19 NOTE — ASSESSMENT
[FreeTextEntry1] : 1- S/P left temporal lobectomy for refractory epilepsy 2- recurrence of seizures 3- Mild mood D/O   plan to increase the X-Copri and decrease the Aptiom F/U

## 2023-08-23 ENCOUNTER — NON-APPOINTMENT (OUTPATIENT)
Age: 53
End: 2023-08-23

## 2023-09-13 RX ORDER — ESLICARBAZEPINE ACETATE 400 MG/1
400 TABLET ORAL
Qty: 30 | Refills: 1 | Status: ACTIVE | COMMUNITY
Start: 2023-08-17 | End: 1900-01-01

## 2023-09-13 RX ORDER — ESLICARBAZEPINE ACETATE 800 MG/1
800 TABLET ORAL
Qty: 60 | Refills: 1 | Status: ACTIVE | COMMUNITY
Start: 2023-08-17 | End: 1900-01-01

## 2023-09-14 RX ORDER — CENOBAMATE 100 MG/1
100 TABLET, FILM COATED ORAL
Qty: 30 | Refills: 5 | Status: COMPLETED | COMMUNITY
Start: 2022-12-27 | End: 2023-09-14

## 2023-10-27 RX ORDER — CENOBAMATE 150-200 MG
14 X 150 MG & KIT ORAL
Qty: 1 | Refills: 2 | Status: DISCONTINUED | COMMUNITY
Start: 2023-08-17 | End: 2023-10-27

## 2024-02-16 ENCOUNTER — APPOINTMENT (OUTPATIENT)
Dept: NEUROLOGY | Facility: CLINIC | Age: 54
End: 2024-02-16
Payer: COMMERCIAL

## 2024-02-16 PROCEDURE — 99442: CPT

## 2024-02-16 NOTE — HISTORY OF PRESENT ILLNESS
[FreeTextEntry1] : Tate was supposed to do this visit as a virtual visit. He did have difficulty with connection We sopoke on the phone He is at his baseline and says as much as he knows he did not have any episodes   no nocturnal events like bed wetting and or tongue biting He is still working at two jobs He says his level of energy is good but not as motivated as he should be Is compliant with meds Plan F/U EEG

## 2024-02-22 RX ORDER — CENOBAMATE 250 MG/DAY
100 & 150 KIT ORAL
Qty: 1 | Refills: 3 | Status: ACTIVE | COMMUNITY
Start: 2023-09-28 | End: 1900-01-01

## 2024-11-29 ENCOUNTER — RX RENEWAL (OUTPATIENT)
Age: 54
End: 2024-11-29

## 2024-12-23 RX ORDER — CENOBAMATE 150 MG/1
150 TABLET, FILM COATED ORAL
Qty: 60 | Refills: 3 | Status: ACTIVE | COMMUNITY
Start: 2024-12-23 | End: 1900-01-01

## 2025-02-06 DIAGNOSIS — G40.109 LOCALIZATION-RELATED (FOCAL) (PARTIAL) SYMPTOMATIC EPILEPSY AND EPILEPTIC SYNDROMES WITH SIMPLE PARTIAL SEIZURES, NOT INTRACTABLE, W/OUT STATUS EPILEPTICUS: ICD-10-CM

## 2025-04-23 DIAGNOSIS — G40.109 LOCALIZATION-RELATED (FOCAL) (PARTIAL) SYMPTOMATIC EPILEPSY AND EPILEPTIC SYNDROMES WITH SIMPLE PARTIAL SEIZURES, NOT INTRACTABLE, W/OUT STATUS EPILEPTICUS: ICD-10-CM

## 2025-04-24 ENCOUNTER — LABORATORY RESULT (OUTPATIENT)
Age: 55
End: 2025-04-24